# Patient Record
Sex: MALE | Race: OTHER | NOT HISPANIC OR LATINO
[De-identification: names, ages, dates, MRNs, and addresses within clinical notes are randomized per-mention and may not be internally consistent; named-entity substitution may affect disease eponyms.]

---

## 2017-01-02 ENCOUNTER — RESULT REVIEW (OUTPATIENT)
Age: 47
End: 2017-01-02

## 2017-01-03 ENCOUNTER — RESULT REVIEW (OUTPATIENT)
Age: 47
End: 2017-01-03

## 2017-04-04 ENCOUNTER — APPOINTMENT (OUTPATIENT)
Dept: CARDIOLOGY | Facility: CLINIC | Age: 47
End: 2017-04-04

## 2017-04-04 ENCOUNTER — NON-APPOINTMENT (OUTPATIENT)
Age: 47
End: 2017-04-04

## 2017-04-04 VITALS — HEART RATE: 84 BPM | SYSTOLIC BLOOD PRESSURE: 100 MMHG | DIASTOLIC BLOOD PRESSURE: 80 MMHG

## 2017-04-04 VITALS
OXYGEN SATURATION: 99 % | HEART RATE: 73 BPM | SYSTOLIC BLOOD PRESSURE: 117 MMHG | HEIGHT: 68 IN | DIASTOLIC BLOOD PRESSURE: 80 MMHG | RESPIRATION RATE: 17 BRPM | BODY MASS INDEX: 32.74 KG/M2 | WEIGHT: 216 LBS

## 2017-04-10 LAB
ALBUMIN SERPL ELPH-MCNC: 4.5 G/DL
ALP BLD-CCNC: 84 U/L
ALT SERPL-CCNC: 27 U/L
AST SERPL-CCNC: 22 U/L
BILIRUB DIRECT SERPL-MCNC: 0.1 MG/DL
BILIRUB INDIRECT SERPL-MCNC: 0.4 MG/DL
BILIRUB SERPL-MCNC: 0.5 MG/DL
CK SERPL-CCNC: 129 U/L
PROT SERPL-MCNC: 8 G/DL

## 2017-04-20 ENCOUNTER — APPOINTMENT (OUTPATIENT)
Dept: CARDIOLOGY | Facility: CLINIC | Age: 47
End: 2017-04-20

## 2018-07-17 ENCOUNTER — APPOINTMENT (OUTPATIENT)
Dept: FAMILY MEDICINE | Facility: CLINIC | Age: 48
End: 2018-07-17
Payer: COMMERCIAL

## 2018-07-17 VITALS
HEIGHT: 68 IN | HEART RATE: 78 BPM | RESPIRATION RATE: 20 BRPM | DIASTOLIC BLOOD PRESSURE: 78 MMHG | WEIGHT: 218 LBS | BODY MASS INDEX: 33.04 KG/M2 | SYSTOLIC BLOOD PRESSURE: 120 MMHG

## 2018-07-17 DIAGNOSIS — S39.011A STRAIN OF MUSCLE, FASCIA AND TENDON OF ABDOMEN, INITIAL ENCOUNTER: ICD-10-CM

## 2018-07-17 PROCEDURE — 99214 OFFICE O/P EST MOD 30 MIN: CPT | Mod: 25

## 2018-07-17 PROCEDURE — 36415 COLL VENOUS BLD VENIPUNCTURE: CPT

## 2018-07-17 PROCEDURE — G0444 DEPRESSION SCREEN ANNUAL: CPT | Mod: 26

## 2018-07-17 RX ORDER — NAPROXEN 500 MG/1
500 TABLET ORAL
Qty: 60 | Refills: 0 | Status: DISCONTINUED | COMMUNITY
Start: 2017-09-11

## 2018-07-17 NOTE — HEALTH RISK ASSESSMENT
[0] : 2) Feeling down, depressed, or hopeless: Not at all (0) [Discussed at today's visit] : Advance Directives Discussed at today's visit [] : No [FreeTextEntry4] : to discuss with wife

## 2018-07-17 NOTE — PHYSICAL EXAM
[No Acute Distress] : no acute distress [No JVD] : no jugular venous distention [Supple] : supple [No Respiratory Distress] : no respiratory distress  [Clear to Auscultation] : lungs were clear to auscultation bilaterally [No Accessory Muscle Use] : no accessory muscle use [Normal Rate] : normal rate  [Regular Rhythm] : with a regular rhythm [Normal S1, S2] : normal S1 and S2 [No Murmur] : no murmur heard [No Carotid Bruits] : no carotid bruits [No Abdominal Bruit] : a ~M bruit was not heard ~T in the abdomen [No Varicosities] : no varicosities [No Edema] : there was no peripheral edema [No Extremity Clubbing/Cyanosis] : no extremity clubbing/cyanosis [No Palpable Aorta] : no palpable aorta [Soft] : abdomen soft [Non-distended] : non-distended [No Masses] : no abdominal mass palpated [No HSM] : no HSM [Normal Bowel Sounds] : normal bowel sounds [No CVA Tenderness] : no CVA  tenderness [No Spinal Tenderness] : no spinal tenderness [No Joint Swelling] : no joint swelling [Grossly Normal Strength/Tone] : grossly normal strength/tone [No Focal Deficits] : no focal deficits [Alert and Oriented x3] : oriented to person, place, and time [de-identified] : mild tenderness over R lat lower rib area and R side of abd wall

## 2018-07-17 NOTE — HISTORY OF PRESENT ILLNESS
[de-identified] : Presents for BP check, labs, and general follow-up after an extended period of time.  States has been feeling well - trying to do aerobic exercise, watch diet.  Note had been prescribed statin by Cardiology about a year ago but stopped taking it.  Does states has had vague pain R side which is slowly resolving - states had been doing heavy yard work.

## 2018-07-17 NOTE — ASSESSMENT
[FreeTextEntry1] : Hemodynamically stable with cardiac exam unremarkable and acceptable BP\par Feel prob muscle strain from heavy physical work\par Lab profiles sent

## 2018-07-18 LAB
ALBUMIN SERPL ELPH-MCNC: 4.8 G/DL
ALP BLD-CCNC: 84 U/L
ALT SERPL-CCNC: 27 U/L
ANION GAP SERPL CALC-SCNC: 14 MMOL/L
APPEARANCE: CLEAR
AST SERPL-CCNC: 20 U/L
BASOPHILS # BLD AUTO: 0.03 K/UL
BASOPHILS NFR BLD AUTO: 0.3 %
BILIRUB SERPL-MCNC: 0.5 MG/DL
BILIRUBIN URINE: NEGATIVE
BLOOD URINE: NEGATIVE
BUN SERPL-MCNC: 14 MG/DL
CALCIUM SERPL-MCNC: 10.1 MG/DL
CHLORIDE SERPL-SCNC: 103 MMOL/L
CHOLEST SERPL-MCNC: 201 MG/DL
CHOLEST/HDLC SERPL: 5.3 RATIO
CO2 SERPL-SCNC: 22 MMOL/L
COLOR: YELLOW
CREAT SERPL-MCNC: 1.08 MG/DL
EOSINOPHIL # BLD AUTO: 0.27 K/UL
EOSINOPHIL NFR BLD AUTO: 3.1 %
GLUCOSE QUALITATIVE U: NEGATIVE MG/DL
GLUCOSE SERPL-MCNC: 116 MG/DL
HBA1C MFR BLD HPLC: 5.8 %
HCT VFR BLD CALC: 47.6 %
HDLC SERPL-MCNC: 38 MG/DL
HGB BLD-MCNC: 16.3 G/DL
IMM GRANULOCYTES NFR BLD AUTO: 0.2 %
KETONES URINE: NEGATIVE
LDLC SERPL CALC-MCNC: 125 MG/DL
LEUKOCYTE ESTERASE URINE: NEGATIVE
LYMPHOCYTES # BLD AUTO: 2.36 K/UL
LYMPHOCYTES NFR BLD AUTO: 27.4 %
MAN DIFF?: NORMAL
MCHC RBC-ENTMCNC: 28.3 PG
MCHC RBC-ENTMCNC: 34.2 GM/DL
MCV RBC AUTO: 82.6 FL
MONOCYTES # BLD AUTO: 0.48 K/UL
MONOCYTES NFR BLD AUTO: 5.6 %
NEUTROPHILS # BLD AUTO: 5.45 K/UL
NEUTROPHILS NFR BLD AUTO: 63.4 %
NITRITE URINE: NEGATIVE
PH URINE: 5.5
PLATELET # BLD AUTO: 317 K/UL
POTASSIUM SERPL-SCNC: 4.2 MMOL/L
PROT SERPL-MCNC: 7.7 G/DL
PROTEIN URINE: NEGATIVE MG/DL
PSA SERPL-MCNC: 1.26 NG/ML
RBC # BLD: 5.76 M/UL
RBC # FLD: 13.3 %
SODIUM SERPL-SCNC: 139 MMOL/L
SPECIFIC GRAVITY URINE: 1.02
TRIGL SERPL-MCNC: 188 MG/DL
URATE SERPL-MCNC: 8.3 MG/DL
UROBILINOGEN URINE: NEGATIVE MG/DL
WBC # FLD AUTO: 8.61 K/UL

## 2019-06-20 ENCOUNTER — APPOINTMENT (OUTPATIENT)
Dept: FAMILY MEDICINE | Facility: CLINIC | Age: 49
End: 2019-06-20
Payer: COMMERCIAL

## 2019-06-20 VITALS
SYSTOLIC BLOOD PRESSURE: 130 MMHG | DIASTOLIC BLOOD PRESSURE: 80 MMHG | WEIGHT: 220 LBS | OXYGEN SATURATION: 98 % | BODY MASS INDEX: 33.34 KG/M2 | RESPIRATION RATE: 20 BRPM | HEIGHT: 68 IN | HEART RATE: 76 BPM

## 2019-06-20 PROCEDURE — 99213 OFFICE O/P EST LOW 20 MIN: CPT | Mod: 25

## 2019-06-20 PROCEDURE — 36415 COLL VENOUS BLD VENIPUNCTURE: CPT

## 2019-06-20 NOTE — HISTORY OF PRESENT ILLNESS
[FreeTextEntry8] : Presents on acute basis - has been experiencing a "heaviness in the chest" for about a month; comes and goes; states more prominent after exertion - on questioning admits that activities done without problem several months ago are causing him to become "winded" at present.  Denies PND or orthopnea.  Denies actual chest pain.\par Does states smokes 2-3 cigarettes/day.

## 2019-06-20 NOTE — PHYSICAL EXAM
[No Acute Distress] : no acute distress [No JVD] : no jugular venous distention [Supple] : supple [No Lymphadenopathy] : no lymphadenopathy [Thyroid Normal, No Nodules] : the thyroid was normal and there were no nodules present [Clear to Auscultation] : lungs were clear to auscultation bilaterally [No Respiratory Distress] : no respiratory distress  [Normal Rate] : normal rate  [Normal S1, S2] : normal S1 and S2 [Regular Rhythm] : with a regular rhythm [No Accessory Muscle Use] : no accessory muscle use [Soft] : abdomen soft [No Edema] : there was no peripheral edema [No Murmur] : no murmur heard [Non Tender] : non-tender [Non-distended] : non-distended [No Masses] : no abdominal mass palpated [No HSM] : no HSM [Coordination Grossly Intact] : coordination grossly intact [Normal Gait] : normal gait [Normal Bowel Sounds] : normal bowel sounds [No Focal Deficits] : no focal deficits [Alert and Oriented x3] : oriented to person, place, and time [de-identified] : no calf tenderness

## 2019-06-20 NOTE — ASSESSMENT
[FreeTextEntry1] : Subjective OLEARY and "chest heaviness" with essentially normal findings on exam at this encounter\par Lab profiles sent\par Feel prudent to obtain imaging and have Cardiology evaluate - note has seen Dr. South; Pulmonology eval also to be considered

## 2019-06-21 LAB
ALBUMIN SERPL ELPH-MCNC: 5 G/DL
ALP BLD-CCNC: 96 U/L
ALT SERPL-CCNC: 31 U/L
ANION GAP SERPL CALC-SCNC: 12 MMOL/L
AST SERPL-CCNC: 19 U/L
BASOPHILS # BLD AUTO: 0.06 K/UL
BASOPHILS NFR BLD AUTO: 0.6 %
BILIRUB SERPL-MCNC: 0.3 MG/DL
BUN SERPL-MCNC: 14 MG/DL
CALCIUM SERPL-MCNC: 10.3 MG/DL
CHLORIDE SERPL-SCNC: 104 MMOL/L
CHOLEST SERPL-MCNC: 225 MG/DL
CHOLEST/HDLC SERPL: 5.6 RATIO
CO2 SERPL-SCNC: 24 MMOL/L
CREAT SERPL-MCNC: 0.94 MG/DL
EOSINOPHIL # BLD AUTO: 0.28 K/UL
EOSINOPHIL NFR BLD AUTO: 2.8 %
ESTIMATED AVERAGE GLUCOSE: 123 MG/DL
FOLATE SERPL-MCNC: 9.1 NG/ML
GLUCOSE SERPL-MCNC: 109 MG/DL
HBA1C MFR BLD HPLC: 5.9 %
HCT VFR BLD CALC: 49.8 %
HDLC SERPL-MCNC: 40 MG/DL
HGB BLD-MCNC: 16.6 G/DL
IMM GRANULOCYTES NFR BLD AUTO: 0.4 %
LDLC SERPL CALC-MCNC: 136 MG/DL
LYMPHOCYTES # BLD AUTO: 2.76 K/UL
LYMPHOCYTES NFR BLD AUTO: 27.2 %
MAN DIFF?: NORMAL
MCHC RBC-ENTMCNC: 28.1 PG
MCHC RBC-ENTMCNC: 33.3 GM/DL
MCV RBC AUTO: 84.4 FL
MONOCYTES # BLD AUTO: 0.69 K/UL
MONOCYTES NFR BLD AUTO: 6.8 %
NEUTROPHILS # BLD AUTO: 6.33 K/UL
NEUTROPHILS NFR BLD AUTO: 62.2 %
PLATELET # BLD AUTO: 331 K/UL
POTASSIUM SERPL-SCNC: 4 MMOL/L
PROT SERPL-MCNC: 7.5 G/DL
RBC # BLD: 5.9 M/UL
RBC # FLD: 13.4 %
SODIUM SERPL-SCNC: 140 MMOL/L
T4 FREE SERPL-MCNC: 1 NG/DL
TRIGL SERPL-MCNC: 245 MG/DL
TSH SERPL-ACNC: 3.77 UIU/ML
VIT B12 SERPL-MCNC: 336 PG/ML
WBC # FLD AUTO: 10.16 K/UL

## 2019-06-28 ENCOUNTER — APPOINTMENT (OUTPATIENT)
Dept: FAMILY MEDICINE | Facility: CLINIC | Age: 49
End: 2019-06-28

## 2019-07-16 ENCOUNTER — APPOINTMENT (OUTPATIENT)
Dept: CARDIOLOGY | Facility: CLINIC | Age: 49
End: 2019-07-16

## 2020-02-11 ENCOUNTER — APPOINTMENT (OUTPATIENT)
Dept: FAMILY MEDICINE | Facility: CLINIC | Age: 50
End: 2020-02-11
Payer: COMMERCIAL

## 2020-02-11 VITALS
WEIGHT: 227 LBS | HEART RATE: 76 BPM | RESPIRATION RATE: 20 BRPM | BODY MASS INDEX: 34.4 KG/M2 | DIASTOLIC BLOOD PRESSURE: 70 MMHG | SYSTOLIC BLOOD PRESSURE: 126 MMHG | HEIGHT: 68 IN

## 2020-02-11 DIAGNOSIS — K76.0 FATTY (CHANGE OF) LIVER, NOT ELSEWHERE CLASSIFIED: ICD-10-CM

## 2020-02-11 DIAGNOSIS — R91.1 SOLITARY PULMONARY NODULE: ICD-10-CM

## 2020-02-11 PROCEDURE — 36415 COLL VENOUS BLD VENIPUNCTURE: CPT

## 2020-02-11 PROCEDURE — 99396 PREV VISIT EST AGE 40-64: CPT | Mod: 25

## 2020-02-11 PROCEDURE — 93000 ELECTROCARDIOGRAM COMPLETE: CPT

## 2020-02-11 NOTE — HISTORY OF PRESENT ILLNESS
[FreeTextEntry1] : Requests comprehensive exam [de-identified] : Presents for comprehensive exam.  States continues to be concerned about intermittent chest discomfort and exertional dyspnea.  Did not obtain CT ordered at last visit due to insurance denial.  Note known H/O pulmonary nodule that heightens concern of patient.  Also C/O intermittent R-sided abdominal discomfort; again note known H/O hepatic steatosis.  Otherwise actively trying to watch diet and remain active.  Continues to smoke 2-3 cigarettes/day - discussed cessation.  Reviewed screening and immunizations - offered HIV screening per guidelines - pt is amenable.

## 2020-02-11 NOTE — HEALTH RISK ASSESSMENT
[] : Yes [6-10] : 6-10 [Yes] : Yes [Monthly or less (1 pt)] : Monthly or less (1 point) [1 or 2 (0 pts)] : 1 or 2 (0 points) [Never (0 pts)] : Never (0 points) [No] : In the past 12 months have you used drugs other than those required for medical reasons? No [0] : 1) Little interest or pleasure doing things: Not at all (0) [With Patient/Caregiver] : With Patient/Caregiver [Audit-CScore] : 1 [AdvancecareDate] : 02/20 [FreeTextEntry4] : to check records

## 2020-02-11 NOTE — PHYSICAL EXAM
[Declined Rectal Exam] : declined rectal exam [Normal Posterior Cervical Nodes] : no posterior cervical lymphadenopathy [Normal Anterior Cervical Nodes] : no anterior cervical lymphadenopathy [Normal Inguinal Nodes] : no inguinal lymphadenopathy [Normal Femoral Nodes] : no femoral lymphadenopathy [Normal] : no rash [Coordination Grossly Intact] : coordination grossly intact [No Focal Deficits] : no focal deficits [Normal Gait] : normal gait [Speech Grossly Normal] : speech grossly normal [Memory Grossly Normal] : memory grossly normal [Alert and Oriented x3] : oriented to person, place, and time [Normal Affect] : the affect was normal [Normal Mood] : the mood was normal [Normal Insight/Judgement] : insight and judgment were intact [FreeTextEntry1] : declined exam

## 2020-02-11 NOTE — COUNSELING
[Risk of tobacco use and health benefits of smoking cessation discussed] : Risk of tobacco use and health benefits of smoking cessation discussed [Cessation strategies including cessation program discussed] : Cessation strategies including cessation program discussed [Willing to Quit Smoking] : Willing to quit smoking [Benefits of weight loss discussed] : Benefits of weight loss discussed [de-identified] : Healthy eating and activities [None] : None [Good understanding] : Patient has a good understanding of lifestyle changes and steps needed to achieve self management goal

## 2020-02-11 NOTE — REVIEW OF SYSTEMS
[Chest Pain] : chest pain [Shortness Of Breath] : shortness of breath [Abdominal Pain] : abdominal pain [Negative] : Heme/Lymph

## 2020-02-11 NOTE — ASSESSMENT
[FreeTextEntry1] : Comprehensive exam reveals patient to be hemodynamically stable with acceptable BP\par With known H/O lung nodule and hepatic steatosis and symptoms related to those systems feel prudent to attempt to obtain CT imaging to R/O underlying advancing pathology\par Lab profiles drawn in office and sent

## 2020-02-12 LAB
ALBUMIN SERPL ELPH-MCNC: 4.8 G/DL
ALP BLD-CCNC: 93 U/L
ALT SERPL-CCNC: 37 U/L
ANION GAP SERPL CALC-SCNC: 13 MMOL/L
APPEARANCE: CLEAR
AST SERPL-CCNC: 29 U/L
BASOPHILS # BLD AUTO: 0.08 K/UL
BASOPHILS NFR BLD AUTO: 0.7 %
BILIRUB SERPL-MCNC: 0.2 MG/DL
BILIRUBIN URINE: NEGATIVE
BLOOD URINE: NEGATIVE
BUN SERPL-MCNC: 11 MG/DL
CALCIUM SERPL-MCNC: 9.7 MG/DL
CHLORIDE SERPL-SCNC: 104 MMOL/L
CHOLEST SERPL-MCNC: 218 MG/DL
CHOLEST/HDLC SERPL: 5.9 RATIO
CO2 SERPL-SCNC: 23 MMOL/L
COLOR: NORMAL
CREAT SERPL-MCNC: 1.03 MG/DL
EOSINOPHIL # BLD AUTO: 0.32 K/UL
EOSINOPHIL NFR BLD AUTO: 2.7 %
ESTIMATED AVERAGE GLUCOSE: 134 MG/DL
GLUCOSE QUALITATIVE U: NEGATIVE
GLUCOSE SERPL-MCNC: 114 MG/DL
HBA1C MFR BLD HPLC: 6.3 %
HCT VFR BLD CALC: 46.3 %
HDLC SERPL-MCNC: 37 MG/DL
HGB BLD-MCNC: 16 G/DL
HIV1+2 AB SPEC QL IA.RAPID: NONREACTIVE
IMM GRANULOCYTES NFR BLD AUTO: 0.5 %
KETONES URINE: NEGATIVE
LDLC SERPL CALC-MCNC: 101 MG/DL
LEUKOCYTE ESTERASE URINE: NEGATIVE
LYMPHOCYTES # BLD AUTO: 2.8 K/UL
LYMPHOCYTES NFR BLD AUTO: 23.7 %
MAN DIFF?: NORMAL
MCHC RBC-ENTMCNC: 28.6 PG
MCHC RBC-ENTMCNC: 34.6 GM/DL
MCV RBC AUTO: 82.7 FL
MONOCYTES # BLD AUTO: 0.63 K/UL
MONOCYTES NFR BLD AUTO: 5.3 %
NEUTROPHILS # BLD AUTO: 7.91 K/UL
NEUTROPHILS NFR BLD AUTO: 67.1 %
NITRITE URINE: NEGATIVE
PH URINE: 5.5
PLATELET # BLD AUTO: 341 K/UL
POTASSIUM SERPL-SCNC: 4 MMOL/L
PROT SERPL-MCNC: 7.3 G/DL
PROTEIN URINE: NEGATIVE
PSA SERPL-MCNC: 1 NG/ML
RBC # BLD: 5.6 M/UL
RBC # FLD: 12.5 %
SODIUM SERPL-SCNC: 139 MMOL/L
SPECIFIC GRAVITY URINE: 1.01
T4 FREE SERPL-MCNC: 1.2 NG/DL
TRIGL SERPL-MCNC: 398 MG/DL
TSH SERPL-ACNC: 3.2 UIU/ML
URATE SERPL-MCNC: 7.6 MG/DL
UROBILINOGEN URINE: NORMAL
WBC # FLD AUTO: 11.8 K/UL

## 2020-02-24 ENCOUNTER — FORM ENCOUNTER (OUTPATIENT)
Age: 50
End: 2020-02-24

## 2020-02-25 ENCOUNTER — OUTPATIENT (OUTPATIENT)
Dept: OUTPATIENT SERVICES | Facility: HOSPITAL | Age: 50
LOS: 1 days | End: 2020-02-25
Payer: COMMERCIAL

## 2020-02-25 ENCOUNTER — APPOINTMENT (OUTPATIENT)
Dept: RADIOLOGY | Facility: HOSPITAL | Age: 50
End: 2020-02-25
Payer: COMMERCIAL

## 2020-02-25 ENCOUNTER — APPOINTMENT (OUTPATIENT)
Dept: ULTRASOUND IMAGING | Facility: HOSPITAL | Age: 50
End: 2020-02-25
Payer: COMMERCIAL

## 2020-02-25 DIAGNOSIS — K76.0 FATTY (CHANGE OF) LIVER, NOT ELSEWHERE CLASSIFIED: ICD-10-CM

## 2020-02-25 PROCEDURE — 76700 US EXAM ABDOM COMPLETE: CPT

## 2020-02-25 PROCEDURE — 76700 US EXAM ABDOM COMPLETE: CPT | Mod: 26

## 2020-02-26 ENCOUNTER — FORM ENCOUNTER (OUTPATIENT)
Age: 50
End: 2020-02-26

## 2020-02-27 ENCOUNTER — OUTPATIENT (OUTPATIENT)
Dept: OUTPATIENT SERVICES | Facility: HOSPITAL | Age: 50
LOS: 1 days | End: 2020-02-27
Payer: COMMERCIAL

## 2020-02-27 ENCOUNTER — APPOINTMENT (OUTPATIENT)
Dept: RADIOLOGY | Facility: HOSPITAL | Age: 50
End: 2020-02-27
Payer: COMMERCIAL

## 2020-02-27 DIAGNOSIS — R91.1 SOLITARY PULMONARY NODULE: ICD-10-CM

## 2020-02-27 PROCEDURE — 71046 X-RAY EXAM CHEST 2 VIEWS: CPT | Mod: 26

## 2020-02-27 PROCEDURE — 71046 X-RAY EXAM CHEST 2 VIEWS: CPT

## 2020-07-19 ENCOUNTER — TRANSCRIPTION ENCOUNTER (OUTPATIENT)
Age: 50
End: 2020-07-19

## 2020-10-30 ENCOUNTER — TRANSCRIPTION ENCOUNTER (OUTPATIENT)
Age: 50
End: 2020-10-30

## 2021-03-16 ENCOUNTER — TRANSCRIPTION ENCOUNTER (OUTPATIENT)
Age: 51
End: 2021-03-16

## 2021-03-23 ENCOUNTER — TRANSCRIPTION ENCOUNTER (OUTPATIENT)
Age: 51
End: 2021-03-23

## 2021-04-01 ENCOUNTER — OUTPATIENT (OUTPATIENT)
Dept: OUTPATIENT SERVICES | Facility: HOSPITAL | Age: 51
LOS: 1 days | End: 2021-04-01
Payer: COMMERCIAL

## 2021-04-01 DIAGNOSIS — Z20.828 CONTACT WITH AND (SUSPECTED) EXPOSURE TO OTHER VIRAL COMMUNICABLE DISEASES: ICD-10-CM

## 2021-04-01 LAB — SARS-COV-2 RNA SPEC QL NAA+PROBE: SIGNIFICANT CHANGE UP

## 2021-04-01 PROCEDURE — U0003: CPT

## 2021-04-01 PROCEDURE — C9803: CPT

## 2021-04-01 PROCEDURE — U0005: CPT

## 2021-04-02 DIAGNOSIS — Z20.828 CONTACT WITH AND (SUSPECTED) EXPOSURE TO OTHER VIRAL COMMUNICABLE DISEASES: ICD-10-CM

## 2021-04-27 ENCOUNTER — TRANSCRIPTION ENCOUNTER (OUTPATIENT)
Age: 51
End: 2021-04-27

## 2021-10-29 ENCOUNTER — NON-APPOINTMENT (OUTPATIENT)
Age: 51
End: 2021-10-29

## 2021-10-29 ENCOUNTER — APPOINTMENT (OUTPATIENT)
Dept: FAMILY MEDICINE | Facility: CLINIC | Age: 51
End: 2021-10-29
Payer: COMMERCIAL

## 2021-10-29 VITALS
RESPIRATION RATE: 15 BRPM | TEMPERATURE: 97 F | BODY MASS INDEX: 34.4 KG/M2 | DIASTOLIC BLOOD PRESSURE: 80 MMHG | SYSTOLIC BLOOD PRESSURE: 124 MMHG | HEART RATE: 76 BPM | OXYGEN SATURATION: 96 % | HEIGHT: 68 IN | WEIGHT: 227 LBS

## 2021-10-29 PROCEDURE — 93000 ELECTROCARDIOGRAM COMPLETE: CPT

## 2021-10-29 PROCEDURE — 99214 OFFICE O/P EST MOD 30 MIN: CPT | Mod: 25

## 2021-10-29 NOTE — PHYSICAL EXAM
[No Acute Distress] : no acute distress [EOMI] : extraocular movements intact [Supple] : supple [Clear to Auscultation] : lungs were clear to auscultation bilaterally [Normal S1, S2] : normal S1 and S2 [Soft] : abdomen soft [Non Tender] : non-tender [No Rash] : no rash [Normal Gait] : normal gait [Normal Affect] : the affect was normal [de-identified] : obese

## 2021-10-29 NOTE — REVIEW OF SYSTEMS
[Chest Pain] : chest pain [Palpitations] : no palpitations [Claudication] : no  leg claudication [Lower Ext Edema] : no lower extremity edema [Orthopena] : no orthopnea [Negative] : Heme/Lymph none

## 2021-10-29 NOTE — ASSESSMENT
[FreeTextEntry1] : labs redrawn today\par EKG, vitals wnl\par advised to follow up with cardiology, referral provided\par advised to go to ER if severe worsening symptoms.

## 2021-10-29 NOTE — HISTORY OF PRESENT ILLNESS
[FreeTextEntry8] : left sided chest discomfort \par Mr Jigar Matamoros is a 52 yo male presents today for concerns of left sided chest discomfort, chest pressure, for the last few days on/off. Pt reports it occurs at rest, non exertional, possibly muscle strain. Pt has history of dyslipidemia, obesity, prior smoker, not on any statin therapy, aspirin. Pt states followed up with cardiologist in 2017, was recommended statin therapy, states he is intolerant to statins, and did not follow up. Pt advised today EKG was baseline, no acute changes. Recommended follow up back with cardiology, referral provided again today.

## 2021-11-02 ENCOUNTER — NON-APPOINTMENT (OUTPATIENT)
Age: 51
End: 2021-11-02

## 2021-11-02 ENCOUNTER — APPOINTMENT (OUTPATIENT)
Dept: CARDIOLOGY | Facility: CLINIC | Age: 51
End: 2021-11-02
Payer: COMMERCIAL

## 2021-11-02 VITALS
WEIGHT: 223 LBS | OXYGEN SATURATION: 98 % | HEIGHT: 68 IN | TEMPERATURE: 97.3 F | BODY MASS INDEX: 33.8 KG/M2 | HEART RATE: 80 BPM | RESPIRATION RATE: 16 BRPM | DIASTOLIC BLOOD PRESSURE: 82 MMHG | SYSTOLIC BLOOD PRESSURE: 123 MMHG

## 2021-11-02 VITALS — DIASTOLIC BLOOD PRESSURE: 82 MMHG | SYSTOLIC BLOOD PRESSURE: 118 MMHG | BODY MASS INDEX: 33.91 KG/M2 | HEIGHT: 68 IN

## 2021-11-02 LAB
25(OH)D3 SERPL-MCNC: 24.6 NG/ML
ALBUMIN SERPL ELPH-MCNC: 4.9 G/DL
ALP BLD-CCNC: 94 U/L
ALT SERPL-CCNC: 28 U/L
ANION GAP SERPL CALC-SCNC: 14 MMOL/L
APPEARANCE: CLEAR
AST SERPL-CCNC: 20 U/L
BASOPHILS # BLD AUTO: 0.06 K/UL
BASOPHILS NFR BLD AUTO: 0.6 %
BILIRUB SERPL-MCNC: 0.3 MG/DL
BILIRUBIN URINE: NEGATIVE
BLOOD URINE: NEGATIVE
BUN SERPL-MCNC: 15 MG/DL
CALCIUM SERPL-MCNC: 9.8 MG/DL
CHLORIDE SERPL-SCNC: 104 MMOL/L
CHOLEST SERPL-MCNC: 230 MG/DL
CK SERPL-CCNC: 137 U/L
CO2 SERPL-SCNC: 21 MMOL/L
COLOR: NORMAL
CREAT SERPL-MCNC: 1.08 MG/DL
EOSINOPHIL # BLD AUTO: 0.31 K/UL
EOSINOPHIL NFR BLD AUTO: 2.9 %
ESTIMATED AVERAGE GLUCOSE: 137 MG/DL
FOLATE SERPL-MCNC: 13.5 NG/ML
GLUCOSE QUALITATIVE U: NEGATIVE
GLUCOSE SERPL-MCNC: 122 MG/DL
HBA1C MFR BLD HPLC: 6.4 %
HCT VFR BLD CALC: 49.9 %
HDLC SERPL-MCNC: 44 MG/DL
HGB BLD-MCNC: 17 G/DL
IMM GRANULOCYTES NFR BLD AUTO: 0.7 %
KETONES URINE: NEGATIVE
LDLC SERPL CALC-MCNC: 156 MG/DL
LDLC SERPL DIRECT ASSAY-MCNC: 160 MG/DL
LEUKOCYTE ESTERASE URINE: NEGATIVE
LYMPHOCYTES # BLD AUTO: 3.12 K/UL
LYMPHOCYTES NFR BLD AUTO: 29.4 %
MAN DIFF?: NORMAL
MCHC RBC-ENTMCNC: 28.9 PG
MCHC RBC-ENTMCNC: 34.1 GM/DL
MCV RBC AUTO: 84.7 FL
MONOCYTES # BLD AUTO: 0.64 K/UL
MONOCYTES NFR BLD AUTO: 6 %
NEUTROPHILS # BLD AUTO: 6.42 K/UL
NEUTROPHILS NFR BLD AUTO: 60.4 %
NITRITE URINE: NEGATIVE
NONHDLC SERPL-MCNC: 185 MG/DL
PH URINE: 5.5
PLATELET # BLD AUTO: 330 K/UL
POTASSIUM SERPL-SCNC: 4.9 MMOL/L
PROT SERPL-MCNC: 7.6 G/DL
PROTEIN URINE: NEGATIVE
PSA SERPL-MCNC: 1.19 NG/ML
RBC # BLD: 5.89 M/UL
RBC # FLD: 12.7 %
SODIUM SERPL-SCNC: 139 MMOL/L
SPECIFIC GRAVITY URINE: 1.02
T4 FREE SERPL-MCNC: 1.1 NG/DL
TRIGL SERPL-MCNC: 146 MG/DL
TSH SERPL-ACNC: 2.78 UIU/ML
UROBILINOGEN URINE: NORMAL
VIT B12 SERPL-MCNC: 403 PG/ML
WBC # FLD AUTO: 10.62 K/UL

## 2021-11-02 PROCEDURE — 93000 ELECTROCARDIOGRAM COMPLETE: CPT

## 2021-11-02 PROCEDURE — 99204 OFFICE O/P NEW MOD 45 MIN: CPT

## 2021-11-02 NOTE — ASSESSMENT
[FreeTextEntry1] : Impression:\par 1. Probable noncardiac chest pain in a patient with known coronary disease for approximately 10 years with significant dyslipidemia who has been unable to treat this due to his intolerance of statins\par \par Plan:\par 1. For now will get echocardiogram to rule out any pericardial involvement\par 2. Exercise stress testing to rule out ischemia\par 3. Will prescribe Repatha as given the fact that the patient has premature coronary disease he is at very high risk for events and is in need of lipid lowering therapy

## 2021-11-02 NOTE — PHYSICAL EXAM
[General Appearance - Well Developed] : well developed [Normal Appearance] : normal appearance [Well Groomed] : well groomed [General Appearance - Well Nourished] : well nourished [No Deformities] : no deformities [General Appearance - In No Acute Distress] : no acute distress [Normal Oral Mucosa] : normal oral mucosa [No Oral Pallor] : no oral pallor [No Oral Cyanosis] : no oral cyanosis [Normal Jugular Venous A Waves Present] : normal jugular venous A waves present [Normal Jugular Venous V Waves Present] : normal jugular venous V waves present [No Jugular Venous Meza A Waves] : no jugular venous meza A waves [Respiration, Rhythm And Depth] : normal respiratory rhythm and effort [Exaggerated Use Of Accessory Muscles For Inspiration] : no accessory muscle use [Auscultation Breath Sounds / Voice Sounds] : lungs were clear to auscultation bilaterally [Abdomen Soft] : soft [Abdomen Tenderness] : non-tender [Abdomen Mass (___ Cm)] : no abdominal mass palpated [Abnormal Walk] : normal gait [Gait - Sufficient For Exercise Testing] : the gait was sufficient for exercise testing [Nail Clubbing] : no clubbing of the fingernails [Cyanosis, Localized] : no localized cyanosis [Petechial Hemorrhages (___cm)] : no petechial hemorrhages [Skin Color & Pigmentation] : normal skin color and pigmentation [] : no rash [No Venous Stasis] : no venous stasis [Skin Lesions] : no skin lesions [No Skin Ulcers] : no skin ulcer [No Xanthoma] : no  xanthoma was observed [Oriented To Time, Place, And Person] : oriented to person, place, and time [Affect] : the affect was normal [Mood] : the mood was normal [No Anxiety] : not feeling anxious [Normal Rate] : normal [Normal S1] : normal S1 [Normal S2] : normal S2 [S3] : no S3 [S4] : no S4 [No Murmur] : no murmurs heard [Right Carotid Bruit] : no bruit heard over the right carotid [Left Carotid Bruit] : no bruit heard over the left carotid [Right Femoral Bruit] : no bruit heard over the right femoral artery [Left Femoral Bruit] : no bruit heard over the left femoral artery [2+] : left 2+ [No Abnormalities] : the abdominal aorta was not enlarged and no bruit was heard [No Pitting Edema] : no pitting edema present

## 2021-11-02 NOTE — REASON FOR VISIT
[Symptom and Test Evaluation] : symptom and test evaluation [Coronary Artery Disease] : coronary artery disease [FreeTextEntry1] : This is a 51-year-old man with a history of nonobstructive coronary disease by CTA 12 years ago that included a less than 50% LAD less than 50% circumflex and 70% small right coronary artery. The patient has a history of significant hyperlipidemia and has been totally intolerant of low-dose Crestor and low-dose pravastatin. His most recent lipid profile from last week reveals a total cholesterol 2:30 HDL 44 . The patient over the past several weeks has been complaining of a left-sided chest tightness that lasts from one to 5 minutes not necessarily exertion related and worse with movement of his head and arm. There is no associated shortness of breath nausea vomiting or diaphoresis. The patient was a smoker in the past but has not smoked for some time.

## 2021-11-17 ENCOUNTER — APPOINTMENT (OUTPATIENT)
Dept: CARDIOLOGY | Facility: CLINIC | Age: 51
End: 2021-11-17
Payer: COMMERCIAL

## 2021-11-17 PROCEDURE — 93015 CV STRESS TEST SUPVJ I&R: CPT

## 2021-11-17 PROCEDURE — 93306 TTE W/DOPPLER COMPLETE: CPT

## 2021-11-17 PROCEDURE — 99212 OFFICE O/P EST SF 10 MIN: CPT

## 2021-11-17 NOTE — REASON FOR VISIT
[Other: ____] : [unfilled] [FreeTextEntry1] : Patient presented for stress testing and echocardiography today. They were both favorable. He has normal left ventricular systolic function with no evidence of significant valvular stenosis or insufficiency. Stress testing was negative for ischemia and there was no reproduction of his symptoms. He does on occasion still have the same atypical chest pain he has had in the past. He is extremely concerned as his insurance company will not allow Repatha. He is very concerned over his risk of myocardial infarction. He has been suggested by his insurance company that he try Zetia. I have explained to him that Zetia has minimal efficacy in the absence of statins but we certainly can try it. He is asking about other homeopathic-type remedies. I have explained to him that the data does not support these areas he has been prescribed both low-dose Crestor and pravastatin in the past and has had to stop it due to severe muscle aches. His LDL cholesterol is 156. He has a total cholesterol to HDL ratio of well over 5. He has evidence of coronary artery disease by CT scan. Lipid-lowering therapy with Repatha and/or Praluent is absolutely indicated in this situation. I will be undertaking a "peer to peer " discussion with the representatives of his insurance company later today

## 2021-11-18 ENCOUNTER — NON-APPOINTMENT (OUTPATIENT)
Age: 51
End: 2021-11-18

## 2021-11-23 ENCOUNTER — TRANSCRIPTION ENCOUNTER (OUTPATIENT)
Age: 51
End: 2021-11-23

## 2022-01-31 ENCOUNTER — RX RENEWAL (OUTPATIENT)
Age: 52
End: 2022-01-31

## 2022-02-14 ENCOUNTER — APPOINTMENT (OUTPATIENT)
Dept: FAMILY MEDICINE | Facility: CLINIC | Age: 52
End: 2022-02-14
Payer: COMMERCIAL

## 2022-02-14 VITALS
HEIGHT: 68 IN | SYSTOLIC BLOOD PRESSURE: 124 MMHG | WEIGHT: 222 LBS | BODY MASS INDEX: 33.65 KG/M2 | DIASTOLIC BLOOD PRESSURE: 70 MMHG | HEART RATE: 68 BPM | RESPIRATION RATE: 20 BRPM

## 2022-02-14 PROCEDURE — 90686 IIV4 VACC NO PRSV 0.5 ML IM: CPT

## 2022-02-14 PROCEDURE — 36415 COLL VENOUS BLD VENIPUNCTURE: CPT

## 2022-02-14 PROCEDURE — 99396 PREV VISIT EST AGE 40-64: CPT | Mod: 25

## 2022-02-14 PROCEDURE — G0008: CPT

## 2022-02-14 NOTE — HEALTH RISK ASSESSMENT
[Former] : Former [Yes] : Yes [Monthly or less (1 pt)] : Monthly or less (1 point) [1 or 2 (0 pts)] : 1 or 2 (0 points) [Never (0 pts)] : Never (0 points) [No] : In the past 12 months have you used drugs other than those required for medical reasons? No [0] : 2) Feeling down, depressed, or hopeless: Not at all (0) [With Patient/Caregiver] : , with patient/caregiver [Reviewed no changes] : Reviewed, no changes [YearQuit] : 2022 [Audit-CScore] : 1 [AdvancecareDate] : 02/22

## 2022-02-14 NOTE — COUNSELING
[Yes] : Willing to quit smoking [FreeTextEntry2] : note patient has stopped smoking [de-identified] : Healthy eating and activities

## 2022-02-14 NOTE — ASSESSMENT
[FreeTextEntry1] : Comprehensive exam reveals patient to be hemodynamically stable with acceptable BP\par Lab profiles drawn in office and sent\par Flu vaccine given L deltoid

## 2022-02-14 NOTE — HISTORY OF PRESENT ILLNESS
[FreeTextEntry1] : Requests comprehensive exam [de-identified] : Presents for comprehensive exam.  States feeling generally well; trying to watch diet and remain active.  Reviewed recent Cardiology documentation - note pt has been intolerant of statins thus far, but Cardiology was unable to obtain insurance approval for Repatha.  Reviewed screening and immunizations and placed an order for pt's initial screening colonoscopy; also note pt is fully COVID vaccinated including the "booster;" has not received the current flu vaccine but wishes to have one at this encounter.  Also discussed smoking - note pt has stopped smoking over the last several months.

## 2022-02-15 LAB
ALBUMIN SERPL ELPH-MCNC: 4.7 G/DL
ALP BLD-CCNC: 79 U/L
ALT SERPL-CCNC: 23 U/L
ANION GAP SERPL CALC-SCNC: 12 MMOL/L
APPEARANCE: CLEAR
AST SERPL-CCNC: 18 U/L
BASOPHILS # BLD AUTO: 0.08 K/UL
BASOPHILS NFR BLD AUTO: 0.8 %
BILIRUB SERPL-MCNC: 0.3 MG/DL
BILIRUBIN URINE: NEGATIVE
BLOOD URINE: NEGATIVE
BUN SERPL-MCNC: 13 MG/DL
CALCIUM SERPL-MCNC: 9.7 MG/DL
CHLORIDE SERPL-SCNC: 104 MMOL/L
CHOLEST SERPL-MCNC: 230 MG/DL
CO2 SERPL-SCNC: 24 MMOL/L
COLOR: NORMAL
CREAT SERPL-MCNC: 0.92 MG/DL
EOSINOPHIL # BLD AUTO: 0.34 K/UL
EOSINOPHIL NFR BLD AUTO: 3.5 %
ESTIMATED AVERAGE GLUCOSE: 128 MG/DL
GLUCOSE QUALITATIVE U: NEGATIVE
GLUCOSE SERPL-MCNC: 125 MG/DL
HBA1C MFR BLD HPLC: 6.1 %
HCT VFR BLD CALC: 48.3 %
HDLC SERPL-MCNC: 44 MG/DL
HGB BLD-MCNC: 16.4 G/DL
IMM GRANULOCYTES NFR BLD AUTO: 0.4 %
KETONES URINE: NEGATIVE
LDLC SERPL CALC-MCNC: 128 MG/DL
LEUKOCYTE ESTERASE URINE: NEGATIVE
LYMPHOCYTES # BLD AUTO: 2.86 K/UL
LYMPHOCYTES NFR BLD AUTO: 29.7 %
MAN DIFF?: NORMAL
MCHC RBC-ENTMCNC: 28.2 PG
MCHC RBC-ENTMCNC: 34 GM/DL
MCV RBC AUTO: 83 FL
MONOCYTES # BLD AUTO: 0.47 K/UL
MONOCYTES NFR BLD AUTO: 4.9 %
NEUTROPHILS # BLD AUTO: 5.84 K/UL
NEUTROPHILS NFR BLD AUTO: 60.7 %
NITRITE URINE: NEGATIVE
NONHDLC SERPL-MCNC: 186 MG/DL
PH URINE: 5.5
PLATELET # BLD AUTO: 294 K/UL
POTASSIUM SERPL-SCNC: 4.1 MMOL/L
PROT SERPL-MCNC: 7.1 G/DL
PROTEIN URINE: NEGATIVE
RBC # BLD: 5.82 M/UL
RBC # FLD: 12.6 %
SODIUM SERPL-SCNC: 139 MMOL/L
SPECIFIC GRAVITY URINE: 1.02
TRIGL SERPL-MCNC: 289 MG/DL
UROBILINOGEN URINE: NORMAL
WBC # FLD AUTO: 9.63 K/UL

## 2022-04-09 ENCOUNTER — INPATIENT (INPATIENT)
Facility: HOSPITAL | Age: 52
LOS: 2 days | Discharge: HOME | DRG: 247 | End: 2022-04-12
Attending: HOSPITALIST | Admitting: STUDENT IN AN ORGANIZED HEALTH CARE EDUCATION/TRAINING PROGRAM
Payer: COMMERCIAL

## 2022-04-09 ENCOUNTER — EMERGENCY (EMERGENCY)
Facility: HOSPITAL | Age: 52
LOS: 1 days | Discharge: ANOTHER HOSPITAL | End: 2022-04-09
Attending: EMERGENCY MEDICINE | Admitting: EMERGENCY MEDICINE
Payer: COMMERCIAL

## 2022-04-09 VITALS
DIASTOLIC BLOOD PRESSURE: 101 MMHG | WEIGHT: 220.02 LBS | RESPIRATION RATE: 19 BRPM | OXYGEN SATURATION: 99 % | SYSTOLIC BLOOD PRESSURE: 154 MMHG | TEMPERATURE: 99 F | HEART RATE: 63 BPM

## 2022-04-09 VITALS
SYSTOLIC BLOOD PRESSURE: 164 MMHG | HEART RATE: 71 BPM | TEMPERATURE: 99 F | DIASTOLIC BLOOD PRESSURE: 85 MMHG | WEIGHT: 279.99 LBS | OXYGEN SATURATION: 97 % | RESPIRATION RATE: 20 BRPM | HEIGHT: 68 IN

## 2022-04-09 VITALS
SYSTOLIC BLOOD PRESSURE: 113 MMHG | RESPIRATION RATE: 15 BRPM | HEART RATE: 82 BPM | OXYGEN SATURATION: 96 % | DIASTOLIC BLOOD PRESSURE: 70 MMHG

## 2022-04-09 DIAGNOSIS — Z29.9 ENCOUNTER FOR PROPHYLACTIC MEASURES, UNSPECIFIED: ICD-10-CM

## 2022-04-09 DIAGNOSIS — I21.4 NON-ST ELEVATION (NSTEMI) MYOCARDIAL INFARCTION: ICD-10-CM

## 2022-04-09 DIAGNOSIS — E11.9 TYPE 2 DIABETES MELLITUS WITHOUT COMPLICATIONS: ICD-10-CM

## 2022-04-09 DIAGNOSIS — E78.5 HYPERLIPIDEMIA, UNSPECIFIED: ICD-10-CM

## 2022-04-09 DIAGNOSIS — F14.90 COCAINE USE, UNSPECIFIED, UNCOMPLICATED: ICD-10-CM

## 2022-04-09 LAB
ALBUMIN SERPL ELPH-MCNC: 4.3 G/DL — SIGNIFICANT CHANGE UP (ref 3.3–5)
ALP SERPL-CCNC: 80 U/L — SIGNIFICANT CHANGE UP (ref 40–120)
ALT FLD-CCNC: 40 U/L — SIGNIFICANT CHANGE UP (ref 10–45)
AMPHET UR-MCNC: NEGATIVE — SIGNIFICANT CHANGE UP
ANION GAP SERPL CALC-SCNC: 8 MMOL/L — SIGNIFICANT CHANGE UP (ref 5–17)
APTT BLD: 122.9 SEC — CRITICAL HIGH (ref 27.5–35.5)
APTT BLD: 35.4 SEC — SIGNIFICANT CHANGE UP (ref 27.5–35.5)
AST SERPL-CCNC: 22 U/L — SIGNIFICANT CHANGE UP (ref 10–40)
BARBITURATES UR SCN-MCNC: NEGATIVE — SIGNIFICANT CHANGE UP
BASOPHILS # BLD AUTO: 0.08 K/UL — SIGNIFICANT CHANGE UP (ref 0–0.2)
BASOPHILS NFR BLD AUTO: 0.8 % — SIGNIFICANT CHANGE UP (ref 0–2)
BENZODIAZ UR-MCNC: NEGATIVE — SIGNIFICANT CHANGE UP
BILIRUB SERPL-MCNC: 0.4 MG/DL — SIGNIFICANT CHANGE UP (ref 0.2–1.2)
BLD GP AB SCN SERPL QL: NEGATIVE — SIGNIFICANT CHANGE UP
BUN SERPL-MCNC: 17 MG/DL — SIGNIFICANT CHANGE UP (ref 7–23)
CALCIUM SERPL-MCNC: 9.9 MG/DL — SIGNIFICANT CHANGE UP (ref 8.4–10.5)
CHLORIDE SERPL-SCNC: 105 MMOL/L — SIGNIFICANT CHANGE UP (ref 96–108)
CO2 SERPL-SCNC: 29 MMOL/L — SIGNIFICANT CHANGE UP (ref 22–31)
COCAINE METAB.OTHER UR-MCNC: POSITIVE
CREAT SERPL-MCNC: 1.2 MG/DL — SIGNIFICANT CHANGE UP (ref 0.5–1.3)
EGFR: 74 ML/MIN/1.73M2 — SIGNIFICANT CHANGE UP
EOSINOPHIL # BLD AUTO: 0.31 K/UL — SIGNIFICANT CHANGE UP (ref 0–0.5)
EOSINOPHIL NFR BLD AUTO: 3 % — SIGNIFICANT CHANGE UP (ref 0–6)
GLUCOSE BLDC GLUCOMTR-MCNC: 131 MG/DL — HIGH (ref 70–99)
GLUCOSE SERPL-MCNC: 118 MG/DL — HIGH (ref 70–99)
HCT VFR BLD CALC: 46.7 % — SIGNIFICANT CHANGE UP (ref 39–50)
HGB BLD-MCNC: 16.2 G/DL — SIGNIFICANT CHANGE UP (ref 13–17)
IMM GRANULOCYTES NFR BLD AUTO: 0.5 % — SIGNIFICANT CHANGE UP (ref 0–1.5)
INR BLD: 1.07 RATIO — SIGNIFICANT CHANGE UP (ref 0.88–1.16)
INR BLD: 1.18 RATIO — HIGH (ref 0.88–1.16)
LIDOCAIN IGE QN: 179 U/L — SIGNIFICANT CHANGE UP (ref 73–393)
LYMPHOCYTES # BLD AUTO: 29.8 % — SIGNIFICANT CHANGE UP (ref 13–44)
LYMPHOCYTES # BLD AUTO: 3.03 K/UL — SIGNIFICANT CHANGE UP (ref 1–3.3)
MAGNESIUM SERPL-MCNC: 2 MG/DL — SIGNIFICANT CHANGE UP (ref 1.6–2.6)
MCHC RBC-ENTMCNC: 29 PG — SIGNIFICANT CHANGE UP (ref 27–34)
MCHC RBC-ENTMCNC: 34.7 GM/DL — SIGNIFICANT CHANGE UP (ref 32–36)
MCV RBC AUTO: 83.5 FL — SIGNIFICANT CHANGE UP (ref 80–100)
METHADONE UR-MCNC: NEGATIVE — SIGNIFICANT CHANGE UP
MONOCYTES # BLD AUTO: 0.72 K/UL — SIGNIFICANT CHANGE UP (ref 0–0.9)
MONOCYTES NFR BLD AUTO: 7.1 % — SIGNIFICANT CHANGE UP (ref 2–14)
NEUTROPHILS # BLD AUTO: 5.98 K/UL — SIGNIFICANT CHANGE UP (ref 1.8–7.4)
NEUTROPHILS NFR BLD AUTO: 58.8 % — SIGNIFICANT CHANGE UP (ref 43–77)
NRBC # BLD: 0 /100 WBCS — SIGNIFICANT CHANGE UP (ref 0–0)
NT-PROBNP SERPL-SCNC: 94 PG/ML — SIGNIFICANT CHANGE UP (ref 0–300)
OPIATES UR-MCNC: NEGATIVE — SIGNIFICANT CHANGE UP
OXYCODONE UR-MCNC: NEGATIVE — SIGNIFICANT CHANGE UP
PCP SPEC-MCNC: SIGNIFICANT CHANGE UP
PCP UR-MCNC: NEGATIVE — SIGNIFICANT CHANGE UP
PLATELET # BLD AUTO: 328 K/UL — SIGNIFICANT CHANGE UP (ref 150–400)
POTASSIUM SERPL-MCNC: 4.1 MMOL/L — SIGNIFICANT CHANGE UP (ref 3.5–5.3)
POTASSIUM SERPL-SCNC: 4.1 MMOL/L — SIGNIFICANT CHANGE UP (ref 3.5–5.3)
PROT SERPL-MCNC: 7.7 G/DL — SIGNIFICANT CHANGE UP (ref 6–8.3)
PROTHROM AB SERPL-ACNC: 12.4 SEC — SIGNIFICANT CHANGE UP (ref 10.5–13.4)
PROTHROM AB SERPL-ACNC: 13.7 SEC — HIGH (ref 10.5–13.4)
RBC # BLD: 5.59 M/UL — SIGNIFICANT CHANGE UP (ref 4.2–5.8)
RBC # FLD: 12.9 % — SIGNIFICANT CHANGE UP (ref 10.3–14.5)
RH IG SCN BLD-IMP: POSITIVE — SIGNIFICANT CHANGE UP
SARS-COV-2 RNA SPEC QL NAA+PROBE: SIGNIFICANT CHANGE UP
SODIUM SERPL-SCNC: 142 MMOL/L — SIGNIFICANT CHANGE UP (ref 135–145)
THC UR QL: POSITIVE
TROPONIN I, HIGH SENSITIVITY RESULT: 185.3 NG/L — HIGH
TROPONIN I, HIGH SENSITIVITY RESULT: 207.1 NG/L — HIGH
TROPONIN T, HIGH SENSITIVITY RESULT: 84 NG/L — HIGH (ref 0–51)
TROPONIN T, HIGH SENSITIVITY RESULT: 87 NG/L — HIGH (ref 0–51)
WBC # BLD: 10.17 K/UL — SIGNIFICANT CHANGE UP (ref 3.8–10.5)
WBC # FLD AUTO: 10.17 K/UL — SIGNIFICANT CHANGE UP (ref 3.8–10.5)

## 2022-04-09 PROCEDURE — 36415 COLL VENOUS BLD VENIPUNCTURE: CPT

## 2022-04-09 PROCEDURE — 99285 EMERGENCY DEPT VISIT HI MDM: CPT

## 2022-04-09 PROCEDURE — 71045 X-RAY EXAM CHEST 1 VIEW: CPT

## 2022-04-09 PROCEDURE — 93010 ELECTROCARDIOGRAM REPORT: CPT | Mod: 77

## 2022-04-09 PROCEDURE — 83735 ASSAY OF MAGNESIUM: CPT

## 2022-04-09 PROCEDURE — 85025 COMPLETE CBC W/AUTO DIFF WBC: CPT

## 2022-04-09 PROCEDURE — 80053 COMPREHEN METABOLIC PANEL: CPT

## 2022-04-09 PROCEDURE — 83880 ASSAY OF NATRIURETIC PEPTIDE: CPT

## 2022-04-09 PROCEDURE — 71045 X-RAY EXAM CHEST 1 VIEW: CPT | Mod: 26

## 2022-04-09 PROCEDURE — 83690 ASSAY OF LIPASE: CPT

## 2022-04-09 PROCEDURE — 87635 SARS-COV-2 COVID-19 AMP PRB: CPT

## 2022-04-09 PROCEDURE — 99285 EMERGENCY DEPT VISIT HI MDM: CPT | Mod: 25

## 2022-04-09 PROCEDURE — 85610 PROTHROMBIN TIME: CPT

## 2022-04-09 PROCEDURE — 93005 ELECTROCARDIOGRAM TRACING: CPT

## 2022-04-09 PROCEDURE — 85730 THROMBOPLASTIN TIME PARTIAL: CPT

## 2022-04-09 PROCEDURE — 93306 TTE W/DOPPLER COMPLETE: CPT

## 2022-04-09 PROCEDURE — 99223 1ST HOSP IP/OBS HIGH 75: CPT

## 2022-04-09 PROCEDURE — 99222 1ST HOSP IP/OBS MODERATE 55: CPT

## 2022-04-09 PROCEDURE — ZZZZZ: CPT

## 2022-04-09 PROCEDURE — 96374 THER/PROPH/DIAG INJ IV PUSH: CPT | Mod: XU

## 2022-04-09 PROCEDURE — 84484 ASSAY OF TROPONIN QUANT: CPT

## 2022-04-09 PROCEDURE — 93306 TTE W/DOPPLER COMPLETE: CPT | Mod: 26

## 2022-04-09 PROCEDURE — 93010 ELECTROCARDIOGRAM REPORT: CPT

## 2022-04-09 PROCEDURE — 93010 ELECTROCARDIOGRAM REPORT: CPT | Mod: NC

## 2022-04-09 RX ORDER — SODIUM CHLORIDE 9 MG/ML
1000 INJECTION, SOLUTION INTRAVENOUS
Refills: 0 | Status: DISCONTINUED | OUTPATIENT
Start: 2022-04-09 | End: 2022-04-12

## 2022-04-09 RX ORDER — INSULIN LISPRO 100/ML
VIAL (ML) SUBCUTANEOUS
Refills: 0 | Status: DISCONTINUED | OUTPATIENT
Start: 2022-04-09 | End: 2022-04-12

## 2022-04-09 RX ORDER — HEPARIN SODIUM 5000 [USP'U]/ML
6000 INJECTION INTRAVENOUS; SUBCUTANEOUS EVERY 6 HOURS
Refills: 0 | Status: DISCONTINUED | OUTPATIENT
Start: 2022-04-09 | End: 2022-04-13

## 2022-04-09 RX ORDER — LANOLIN ALCOHOL/MO/W.PET/CERES
3 CREAM (GRAM) TOPICAL AT BEDTIME
Refills: 0 | Status: DISCONTINUED | OUTPATIENT
Start: 2022-04-09 | End: 2022-04-12

## 2022-04-09 RX ORDER — INSULIN LISPRO 100/ML
VIAL (ML) SUBCUTANEOUS AT BEDTIME
Refills: 0 | Status: DISCONTINUED | OUTPATIENT
Start: 2022-04-09 | End: 2022-04-12

## 2022-04-09 RX ORDER — HEPARIN SODIUM 5000 [USP'U]/ML
INJECTION INTRAVENOUS; SUBCUTANEOUS
Qty: 25000 | Refills: 0 | Status: DISCONTINUED | OUTPATIENT
Start: 2022-04-09 | End: 2022-04-11

## 2022-04-09 RX ORDER — HEPARIN SODIUM 5000 [USP'U]/ML
5000 INJECTION INTRAVENOUS; SUBCUTANEOUS ONCE
Refills: 0 | Status: COMPLETED | OUTPATIENT
Start: 2022-04-09 | End: 2022-04-09

## 2022-04-09 RX ORDER — DEXTROSE 50 % IN WATER 50 %
25 SYRINGE (ML) INTRAVENOUS ONCE
Refills: 0 | Status: DISCONTINUED | OUTPATIENT
Start: 2022-04-09 | End: 2022-04-12

## 2022-04-09 RX ORDER — HEPARIN SODIUM 5000 [USP'U]/ML
4000 INJECTION INTRAVENOUS; SUBCUTANEOUS EVERY 6 HOURS
Refills: 0 | Status: DISCONTINUED | OUTPATIENT
Start: 2022-04-09 | End: 2022-04-11

## 2022-04-09 RX ORDER — ACETAMINOPHEN 500 MG
650 TABLET ORAL EVERY 6 HOURS
Refills: 0 | Status: DISCONTINUED | OUTPATIENT
Start: 2022-04-09 | End: 2022-04-12

## 2022-04-09 RX ORDER — CLOPIDOGREL BISULFATE 75 MG/1
75 TABLET, FILM COATED ORAL DAILY
Refills: 0 | Status: DISCONTINUED | OUTPATIENT
Start: 2022-04-10 | End: 2022-04-12

## 2022-04-09 RX ORDER — ONDANSETRON 8 MG/1
4 TABLET, FILM COATED ORAL EVERY 8 HOURS
Refills: 0 | Status: DISCONTINUED | OUTPATIENT
Start: 2022-04-09 | End: 2022-04-12

## 2022-04-09 RX ORDER — ASPIRIN/CALCIUM CARB/MAGNESIUM 324 MG
81 TABLET ORAL DAILY
Refills: 0 | Status: DISCONTINUED | OUTPATIENT
Start: 2022-04-10 | End: 2022-04-12

## 2022-04-09 RX ORDER — CLOPIDOGREL BISULFATE 75 MG/1
300 TABLET, FILM COATED ORAL ONCE
Refills: 0 | Status: COMPLETED | OUTPATIENT
Start: 2022-04-09 | End: 2022-04-09

## 2022-04-09 RX ORDER — NITROGLYCERIN 6.5 MG
0.4 CAPSULE, EXTENDED RELEASE ORAL ONCE
Refills: 0 | Status: COMPLETED | OUTPATIENT
Start: 2022-04-09 | End: 2022-04-09

## 2022-04-09 RX ORDER — HEPARIN SODIUM 5000 [USP'U]/ML
INJECTION INTRAVENOUS; SUBCUTANEOUS
Qty: 25000 | Refills: 0 | Status: DISCONTINUED | OUTPATIENT
Start: 2022-04-09 | End: 2022-04-13

## 2022-04-09 RX ORDER — DEXTROSE 50 % IN WATER 50 %
15 SYRINGE (ML) INTRAVENOUS ONCE
Refills: 0 | Status: DISCONTINUED | OUTPATIENT
Start: 2022-04-09 | End: 2022-04-12

## 2022-04-09 RX ORDER — GLUCAGON INJECTION, SOLUTION 0.5 MG/.1ML
1 INJECTION, SOLUTION SUBCUTANEOUS ONCE
Refills: 0 | Status: DISCONTINUED | OUTPATIENT
Start: 2022-04-09 | End: 2022-04-12

## 2022-04-09 RX ORDER — DEXTROSE 50 % IN WATER 50 %
12.5 SYRINGE (ML) INTRAVENOUS ONCE
Refills: 0 | Status: DISCONTINUED | OUTPATIENT
Start: 2022-04-09 | End: 2022-04-12

## 2022-04-09 RX ORDER — ASPIRIN/CALCIUM CARB/MAGNESIUM 324 MG
324 TABLET ORAL ONCE
Refills: 0 | Status: COMPLETED | OUTPATIENT
Start: 2022-04-09 | End: 2022-04-09

## 2022-04-09 RX ADMIN — CLOPIDOGREL BISULFATE 300 MILLIGRAM(S): 75 TABLET, FILM COATED ORAL at 15:03

## 2022-04-09 RX ADMIN — CLOPIDOGREL BISULFATE 300 MILLIGRAM(S): 75 TABLET, FILM COATED ORAL at 14:09

## 2022-04-09 RX ADMIN — HEPARIN SODIUM 5000 UNIT(S): 5000 INJECTION INTRAVENOUS; SUBCUTANEOUS at 15:10

## 2022-04-09 RX ADMIN — HEPARIN SODIUM 0 UNIT(S)/HR: 5000 INJECTION INTRAVENOUS; SUBCUTANEOUS at 18:21

## 2022-04-09 RX ADMIN — Medication 324 MILLIGRAM(S): at 13:13

## 2022-04-09 RX ADMIN — HEPARIN SODIUM 1000 UNIT(S)/HR: 5000 INJECTION INTRAVENOUS; SUBCUTANEOUS at 15:08

## 2022-04-09 RX ADMIN — HEPARIN SODIUM 1000 UNIT(S)/HR: 5000 INJECTION INTRAVENOUS; SUBCUTANEOUS at 17:12

## 2022-04-09 RX ADMIN — HEPARIN SODIUM 700 UNIT(S)/HR: 5000 INJECTION INTRAVENOUS; SUBCUTANEOUS at 19:39

## 2022-04-09 RX ADMIN — Medication 0.4 MILLIGRAM(S): at 15:03

## 2022-04-09 NOTE — ED PROVIDER NOTE - CLINICAL SUMMARY MEDICAL DECISION MAKING FREE TEXT BOX
52M  presents to the ED c/o chest pain. Patient states on Wednesday night he went out and had alcohol. The next day he felt sick (as if he drank too much). He had couple episodes of vomiting but had feeling of discomfort in his chest that he noticed. It got better but then he realized that since then, with exertion, he would have onset of chest discomfort - and describes it as mild. Today, he was walking with his son to his hockey practice and on their way to the facility, with ambulation and exertion, he felt the chest pain again but it was at its worst today. It improves with rest. He then walked again and the same occurred. Concerning to him, he and his wife came to the ED today.     PMH : HLD DM.   No significant family history for CAD (grandmother?)  Pt denies paresthesias, denies new SOB (he states he has a chronic SOB due to sinus issues that are chronic), no new dizziness.     Exam as described. Story concerning for cardiac etiology. Will give ASA. Labs with trop and CXR. EKG reviewed. 52M  presents to the ED c/o chest pain. Patient states on Wednesday night he went out and had alcohol. The next day he felt sick (as if he drank too much). He had couple episodes of vomiting but had feeling of discomfort in his chest that he noticed. It got better but then he realized that since then, with exertion, he would have onset of chest discomfort - and describes it as mild. Today, he was walking with his son to his hockey practice and on their way to the facility, with ambulation and exertion, he felt the chest pain again but it was at its worst today. It improves with rest. He then walked again and the same occurred. Concerning to him, he and his wife came to the ED today.     PMH : HLD DM.   No significant family history for CAD (grandmother?)  Pt denies paresthesias, denies new SOB (he states he has a chronic SOB due to sinus issues that are chronic), no new dizziness.     Exam as described. Story concerning for cardiac etiology. Will give ASA. Labs with trop and CXR. EKG reviewed.    Elevated trop. Trended upward. D/W Dr Duff. Came to evaluate patient. Recc txfr and Plavix/Heparin. ASA was already given.     Txfr center notified.

## 2022-04-09 NOTE — ED PROVIDER NOTE - PROGRESS NOTE DETAILS
Eron, PGY3 - pt seen by cards, states to admit to tele for likely cath tomorrow. Endorsed to hospitalist Hosptialts aware of coags  Jigar Aviles MD, Facep

## 2022-04-09 NOTE — ED PROVIDER NOTE - OBJECTIVE STATEMENT
52M PMH Hyperlipidemia (on Repatha & Ezetimibe) and DM transferred from  ED for coronary angiogram in setting of Type I NSTEMI. Pt reports new onset exertional CP x days. Was found to have uptrending troponins, EKG sinus rhythm. S/p  mg, Plavix 300mg, Heparin bolus & gtt 52M PMH Hyperlipidemia (on Repatha & Ezetimibe) and DM transferred from  ED for coronary angiogram in setting of likely Type I NSTEMI. Pt reports new onset exertional L CP x days. No associated n/v/sweats/SOB, leg swelling/pain, dizziness, LOC. Was found to have uptrending troponins at OSH, EKG sinus rhythm. S/p  mg, Plavix 300mg, Heparin bolus & gtt. Pt currently without CP.

## 2022-04-09 NOTE — ED ADULT NURSE REASSESSMENT NOTE - NS ED NURSE REASSESS COMMENT FT1
cardiology at bedside for eval pt remains pain free at this time heparing infusing as ordered family member at bedside NSR on monitor pending tele bed assignment

## 2022-04-09 NOTE — H&P ADULT - NSHPLABSRESULTS_GEN_ALL_CORE
Labs reviewed:                         16.2   10.17 )-----------( 328      ( 09 Apr 2022 12:39 )             46.7     04-09    142  |  105  |  17  ----------------------------<  118<H>  4.1   |  29  |  1.20    Ca    9.9      09 Apr 2022 12:39  Mg     2.0     04-09    TPro  7.7  /  Alb  4.3  /  TBili  0.4  /  DBili  x   /  AST  22  /  ALT  40  /  AlkPhos  80  04-09    PT/INR - ( 09 Apr 2022 17:26 )   PT: 13.7 sec;   INR: 1.18 ratio         PTT - ( 09 Apr 2022 17:26 )  PTT:122.9 sec      Troponin 84      Imaging personally reviewed:  4/9/22 TTE:    Summary:   1. Technically difficult study with poor endocardial visualization.   2. Normal global left ventricular systolic function.   3. Left ventricular ejection fraction, by visual estimation, is 55 to  60%.   4. Mildly increased LV wall thickness.   5. Normal left ventricular internal cavity size.   6. The right ventricle is not well visualized, appears to have normal systolic function.   7. The left atrium is normal in size.   8. The right atrium is normal in size.   9. Mild thickening and calcification of the anterior and posterior  mitral valve leaflets.  10. Mild mitral valve regurgitation.  11. No aortic valve stenosis.  12. There is no evidence of pericardial effusion.    ECG reviewed and interpreted: NSR with no ischemic changes

## 2022-04-09 NOTE — ED PROVIDER NOTE - CARE PLAN
1 Principal Discharge DX:	Chest pain in adult  Secondary Diagnosis:	Elevated troponin   Principal Discharge DX:	NSTEMI (non-ST elevation myocardial infarction)  Secondary Diagnosis:	Elevated troponin  Secondary Diagnosis:	Chest pain in adult

## 2022-04-09 NOTE — ED PROVIDER NOTE - OBJECTIVE STATEMENT
52M  presents to the ED c/o chest pain. Patient states on Wednesday night he went out and had alcohol. The next day he felt sick (as if he drank too much). He had couple episodes of vomiting but had feeling of discomfort in his chest that he noticed. It got better but then he realized that since then, with exertion, he would have onset of chest discomfort - and describes it as mild. Today, he was walking with his son to his hockey practice and on their way to the facility, with ambulation and exertion, he felt the chest pain again but it was at its worst today. It improves with rest. He then walked again and the same occurred. Concerning to him, he and his wife came to the ED today.     PMH : HLD DM.   No significant family history for CAD (grandmother?)  Pt denies paresthesias, denies new SOB (he states he has a chronic SOB due to sinus issues that are chronic), no new dizziness.

## 2022-04-09 NOTE — CHART NOTE - NSCHARTNOTEFT_GEN_A_CORE
Routine Rounding on pt.    51 yo male with PMH of HTN, HLD, nonobstructive CAD, T2DM who presents with chest tightness x 3-4 days. Patient states he was on a business trip in [place] last week with transient episode of substernal chest tightness with exertion that improved with rest. He began to notice during the middle of the week, he starting to experience more frequent episodes of substernal chest tightness, non-radiating, varying intensity 5-8/10 with minimal exertion. He states he had episodes akin to this previously and had cardiac work-up in the past with decision made for medical management. However, today he was picking up his son from hockey practice when the chest tightness again began with exertion, but did not resolve with rest and continued to worsen during the car ride home. When he arrived home, chest tightness peaked at 8/10 in severity with new dizziness and lightheaded prompting him to present to ED. No headache, blurred vision, diaphoresis, dyspnea, n/v, numbness, nor tingling.    In the ED, vitals stable. Afebrile, HR 70s, SBP 160s. Labs notable for trop 84. EKG with non-ischemic changes. Chest pain improved with initiation of DAPT and hep gtt thus transferred to [facility] ED for Cardiology eval for cardiac cath. Admitted to medicine for NSTEMI.     Of note, per Cardiology consult note:" patient reports that he did use cocaine at the work retreat (which he requests that we do not mention to his family), medicinal marijuana use, but no smoking. He had drinks at his work retreat but he usually does not drink".  Wife was present at time of my exam in the ED so he did not disclose this information to me.    Admitted for NSTEMI  Heparin gtt presently running at 10ml/hr  ASP/Plavix initiated  Plan for [facility] 4/10/2022 AM    Troponin x2 84, 87  EKG NSR at 64 bpm, QTC 402ms in ED    EKG q4hrs 10:30pm 4/9/2022 NSR at 75 bpm, QTC 426ms  NSR at 75bpm, QTC 426ms  Troponin x3 pending.    Pt seen and evaluated at bedside. Pt reporting improvement in substernal chest tightness, and with no acute complaints presently.  Discussed with pt importance of routine Cardiac Enzyme draws and Serial EKG's q4hrs; pt refusing 2:30 AM blood work and EKG, but is amenable to 6am Troponin draw and EKG.  Discussed with pt at length risks of refusal of medical care; pt is A&Ox4 and still refusing.  Will monitor VS closely; worsening/continued chest pain.  Endorsed to RN  Will endorse to AM team Routine Rounding on pt.    51 yo male with PMH of HTN, HLD, nonobstructive CAD, T2DM who presents with chest tightness x 3-4 days. Patient states he was on a business trip in [place] last week with transient episode of substernal chest tightness with exertion that improved with rest. He began to notice during the middle of the week, he starting to experience more frequent episodes of substernal chest tightness, non-radiating, varying intensity 5-8/10 with minimal exertion. He states he had episodes akin to this previously and had cardiac work-up in the past with decision made for medical management. However, today he was picking up his son from hockey practice when the chest tightness again began with exertion, but did not resolve with rest and continued to worsen during the car ride home. When he arrived home, chest tightness peaked at 8/10 in severity with new dizziness and lightheaded prompting him to present to ED. No headache, blurred vision, diaphoresis, dyspnea, n/v, numbness, nor tingling.    In the ED, vitals stable. Afebrile, HR 70s, SBP 160s. Labs notable for trop 84. EKG with non-ischemic changes. Chest pain improved with initiation of DAPT and hep gtt thus transferred to [facility] ED for Cardiology eval for cardiac cath. Admitted to medicine for NSTEMI.     Of note, per Cardiology consult note:" patient reports that he did use cocaine at the work retreat (which he requests that we do not mention to his family), medicinal marijuana use, but no smoking. He had drinks at his work retreat but he usually does not drink".  Wife was present at time of my exam in the ED so he did not disclose this information to me.    Admitted for NSTEMI  Heparin gtt presently running at 10ml/hr  ASP/Plavix initiated  Plan for [facility] 4/10/2022 AM    Troponin x2 84, 87  EKG NSR at 64 bpm, QTC 402ms in ED    EKG q4hrs 10:30pm 4/9/2022 NSR at 75 bpm, QTC 426ms  NSR at 75bpm, QTC 426ms  Troponin x3 73  Utox + for Cocaine and THC    Pt seen and evaluated at bedside. Pt reporting improvement in substernal chest tightness, and with no acute complaints presently.  Discussed with pt importance of routine Cardiac Enzyme draws and Serial EKG's q4hrs; pt refusing 2:30 AM blood work and EKG, but is amenable to 6am Troponin draw and EKG.  Discussed with pt at length risks of refusal of medical care; pt is A&Ox4 and still refusing.  Will monitor VS closely; worsening/continued chest pain.  Endorsed to RN  Will endorse to AM team

## 2022-04-09 NOTE — ED PROVIDER NOTE - CLINICAL SUMMARY MEDICAL DECISION MAKING FREE TEXT BOX
Eron, PGY3 - 52M PMH HLD, DM, nonsmoker transferred from  ED for NSTEMI s/p ASA/Plavix, Heparin bolus, on Heparin gtt.  Currently CP free. EKG NSR no diagnostic ischemic changes. Will d/w cards & admit

## 2022-04-09 NOTE — H&P ADULT - PROBLEM SELECTOR PLAN 2
per Cardiology and [facility] ED provider notes. patient admitted to cocaine use during business trip to other providers however asked that this information not be disclosed to family.  - wife was present bedside during time of my exam so patient did not disclose this information to me  - when wife is not present, will need to clarify last cocaine use and hx with patient  - check utox  - avoid beta blockers given cocaine use

## 2022-04-09 NOTE — H&P ADULT - HISTORY OF PRESENT ILLNESS
53 yo male with PMH of HTN, HLD, nonobstructive CAD, T2DM who presents with chest tightness x 3-4 days. Patient states he was on a business trip in [place] last week with transient episode of substernal chest tightness with exertion that improved with rest. He began to notice during the middle of the week, he starting to experience more frequent episodes of substernal chest tightness, non-radiating, varying intensity 5-8/10 with minimal exertion. He states he had episodes akin to this previously and had cardiac work-up in the past with decision made for medical management. However, today he was picking up his son from hockey practice when the chest tightness again began with exertion, but did not resolve with rest and continued to worsen during the car ride home. When he arrived home, chest tightness peaked at 8/10 in severity with new dizziness and lightheaded prompting him to present to ED. No headache, blurred vision, diaphoresis, dyspnea, n/v, numbness, nor tingling.    In the ED, vitals stable. Afebrile, HR 70s, SBP 160s. Labs notable for trop 84. EKG with non-ischemic changes. Chest pain improved with initiation of DAPT and hep gtt thus transferred to [facility] ED for Cardiology eval for cardiac cath. Admitted to medicine for NSTEMI.     Of note, per Cardiology consult note:" patient reports that he did use cocaine at the work retreat (which he requests that we do not mention to his family), medicinal marijuana use, but no smoking. He had drinks at his work retreat but he usually does not drink".  Wife was present at time of my exam in the ED so he did not disclose this information to me.

## 2022-04-09 NOTE — H&P ADULT - NSHPADDITIONALINFOADULT_GEN_ALL_CORE
.  Aylin Frank MD  Division of Hospital Medicine  [facility]   Pager: [phone]    Plan discussed with patient, wife bedside, and medicine NP Zeta.

## 2022-04-09 NOTE — CONSULT NOTE ADULT - SUBJECTIVE AND OBJECTIVE BOX
JIGAR BADILLO  [id number]      HPI:    Jigar Badillo is a 52 year old man with past medical history of Hyperlipidemia (on Repatha & Ezetimibe) and Diabetes mellitus presents with chest pain.     ALLERGIES:  No Known Allergies      PAST MEDICAL HISTORY:  DM (diabetes mellitus)  High cholesterol      CURRENT MEDICATIONS:  clopidogrel Tablet 300 milliGRAM(s) Oral Once      SOCIAL HISTORY:      FAMILY HISTORY:      ROS:  All 10 systems reviewed and positives noted in HPI    OBJECTIVE:    VITAL SIGNS:  Vital Signs Last 24 Hrs  T(C): 37.1 (09 Apr 2022 12:15), Max: 37.1 (09 Apr 2022 12:15)  T(F): 98.8 (09 Apr 2022 12:15), Max: 98.8 (09 Apr 2022 12:15)  HR: 68 (09 Apr 2022 12:17) (63 - 68)  BP: 112/79 (09 Apr 2022 12:17) (112/79 - 154/101)  BP(mean): 87 (09 Apr 2022 12:17) (87 - 87)  RR: 17 (09 Apr 2022 12:17) (17 - 19)  SpO2: 99% (09 Apr 2022 12:17) (99% - 99%)    PHYSICAL EXAM:  General: well appearing, no distress  HEENT: sclera anicteric  Neck: supple, no carotid bruits b/l  CVS: JVP ~ 7 cm H20, RRR, s1, s2, no murmurs/rubs/gallops  Chest: unlabored respirations, clear to auscultation b/l  Abdomen: non-distended  Extremities: no lower extremity edema b/l  Neuro: awake, alert & oriented x 3  Psych: normal affect      LABS:                        16.2   10.17 )-----------( 328      ( 09 Apr 2022 12:39 )             46.7     04-09    142  |  105  |  17  ----------------------------<  118<H>  4.1   |  29  |  1.20    Ca    9.9      09 Apr 2022 12:39  Mg     2.0     04-09    TPro  7.7  /  Alb  4.3  /  TBili  0.4  /  DBili  x   /  AST  22  /  ALT  40  /  AlkPhos  80  04-09        Outpatient TTE (11/2021):  LVEF 55%  Normal RV size and function  No pericardial effusion     Outpatient Exercise Treadmill stress test (11/2021):  Negative (85% MPHR)      ECG (4/9/22): normal sinus rhythm        JIGAR BADILLO  [id number]      HPI:    Jigar Badillo is a 52 year old man with past medical history of Hyperlipidemia (on Repatha & Ezetimibe) and Diabetes mellitus who presents with chest pain. The patient reports that a few days ago while away on a business trip he started to notice chest discomfort on exertion. The chest pain recurred this morning when ambulating which prompted his presentation to the ER. He denies radiation of the chest pain or shortness of breath. No leg swelling. No palpitations, presyncope or syncope.       ALLERGIES:  No Known Allergies      PAST MEDICAL HISTORY:  DM (diabetes mellitus)  High cholesterol      CURRENT MEDICATIONS:  clopidogrel Tablet 300 milliGRAM(s) Oral Once      SOCIAL HISTORY:  Denies smoking cigarettes      ROS:  All 10 systems reviewed and positives noted in HPI    OBJECTIVE:    VITAL SIGNS:  Vital Signs Last 24 Hrs  T(C): 37.1 (09 Apr 2022 12:15), Max: 37.1 (09 Apr 2022 12:15)  T(F): 98.8 (09 Apr 2022 12:15), Max: 98.8 (09 Apr 2022 12:15)  HR: 68 (09 Apr 2022 12:17) (63 - 68)  BP: 112/79 (09 Apr 2022 12:17) (112/79 - 154/101)  BP(mean): 87 (09 Apr 2022 12:17) (87 - 87)  RR: 17 (09 Apr 2022 12:17) (17 - 19)  SpO2: 99% (09 Apr 2022 12:17) (99% - 99%)    PHYSICAL EXAM:  General: middle aged man, no acute distress  HEENT: sclera anicteric  Neck: supple, no carotid bruits b/l  CVS: JVP ~ 7 cm H20, RRR, s1, s2, no murmurs/rubs/gallops  Chest: unlabored respirations, clear to auscultation b/l  Abdomen: obese  Extremities: no lower extremity edema b/l  Neuro: awake, alert & oriented x 3  Psych: normal affect      LABS:                        16.2   10.17 )-----------( 328      ( 09 Apr 2022 12:39 )             46.7     04-09    142  |  105  |  17  ----------------------------<  118<H>  4.1   |  29  |  1.20    Ca    9.9      09 Apr 2022 12:39  Mg     2.0     04-09    TPro  7.7  /  Alb  4.3  /  TBili  0.4  /  DBili  x   /  AST  22  /  ALT  40  /  AlkPhos  80  04-09        Outpatient TTE (11/2021):  LVEF 55%  Normal RV size and function  No pericardial effusion     Outpatient Exercise Treadmill stress test (11/2021):  Negative (85% MPHR)      ECG (4/9/22): normal sinus rhythm

## 2022-04-09 NOTE — ED PROVIDER NOTE - ATTENDING CONTRIBUTION TO CARE
Private Physician Yulissa Duarte  52y male pmh HLD didn't tolerate statins, DMT2 on metformin, Cad on prior ct coronary. NO Hypertension,habits, complains of  cva, covid. Pt c/o 3d of intermittent cp worse w exertion. Worsened approx noon seen at [place] ED  and transferred to [facility] for NSTEMI. Pain 8/10 improved w rest. after 5-10 min, associated w mild dizziness no radiation, No shortness of breath, abd pain nvdc,palps diaphoresis. Currently pain free. PT tx w asa/plavix/heparin pta.  PE WDWN Nad normocephalic atraumatic neck supple chest, cv no rubs, gallops or murmurs abd soft +bs no mass guarding , neuro no focal defects.  Jigar Aviles MD, Facep

## 2022-04-09 NOTE — ED PROVIDER NOTE - NS ED ROS FT
General: Denies fevers  Eyes: Denies vision changes  ENMT: Denies sore throat  CV: +chest pain  Resp: Denies SOB  GI: Denies abdominal pain, nausea, vomiting, diarrhea  : Denies painful urination  Skin: Denies new rashes  Neuro: Denies headache, focal weakness  MSK: Denies recent trauma

## 2022-04-09 NOTE — CONSULT NOTE ADULT - SUBJECTIVE AND OBJECTIVE BOX
Marshall Calvillo MD  Cardiology Fellow  [phone]  All Cardiology service information can be found 24/7 on [email or web address], password: [email or web address]    Patient seen and evaluated at bedside    Chief Complaint:    HPI:  52M with history nonobstructive CAD, HLD, T2DM presenting with acute on chronic chest pain. Pain started last week while on a business trip in the left chest at rest, then again with minimal exertion. Patient then had chest pain episodes with minimal exertion the past week until today where he had an acute episode at rest. He reports he has had this pain in the past and undergone a cardiac work up with his cardiologist and decision was made for medical management.     No fevers, chills, nausea, vomiting, diarrhea or constipation.     The patient reports that he did use cocaine at the work retreat (which he requests that we do not mention to his family), medicinal marijuana use, but no smoking. He had drinks at his work retreat but he usually does not drink. Normally has excellent ET, no palpitations, no orthopnea, no lower extremity edema.     PMHx:   DM (diabetes mellitus)    High cholesterol    COVID-19 vaccine series completed        PSHx:       Allergies:  No Known Allergies      Home Meds:    Current Medications:   heparin   Injectable 4000 Unit(s) IV Push every 6 hours PRN  heparin  Infusion.  Unit(s)/Hr IV Continuous <Continuous>      FAMILY HISTORY:      Social History:  See above     REVIEW OF SYSTEMS:  CONSTITUTIONAL: No weakness, fevers or chills  EYES/ENT: No visual changes;  No dysphagia  NECK: No pain or stiffness  RESPIRATORY: No cough, wheezing, hemoptysis; No shortness of breath  CARDIOVASCULAR: + chest pain and no palpitations; No lower extremity edema  GASTROINTESTINAL: No abdominal or epigastric pain. No nausea, vomiting, or hematemesis; No diarrhea or constipation. No melena or hematochezia.  BACK: No back pain  GENITOURINARY: No dysuria, frequency or hematuria  NEUROLOGICAL: No numbness or weakness  SKIN: No itching, burning, rashes, or lesions   All other review of systems is negative unless indicated above.    Physical Exam:  T(F): 98.7 (04-09), Max: 98.8 (04-09)  HR: 71 (04-09) (63 - 82)  BP: 164/85 (04-09) (112/79 - 164/85)  RR: 20 (04-09)  SpO2: 97% (04-09)  GENERAL: No acute distress, well-developed  HEAD:  Atraumatic, Normocephalic  ENT: No JVD,  CHEST/LUNG: Clear to auscultation bilaterally; No wheeze, equal breath sounds bilaterally   BACK: No spinal tenderness  HEART: Regular rate and rhythm; No murmurs, rubs, or gallops  ABDOMEN: Soft, Nontender, Nondistended; Bowel sounds present  EXTREMITIES:  No clubbing, cyanosis, or edema  PSYCH: Nl behavior, nl affect  NEUROLOGY: AAOx3, non-focal, cranial nerves intact  SKIN: Normal color, No rashes or lesions  LINES:    Cardiovascular Diagnostic Testing:    ECG: Personally reviewed: NSR     Echo:   4/9  Summary:   1. Technically difficult study with poor endocardial visualization.   2. Normal global left ventricular systolic function.   3. Left ventricular ejection fraction, by visual estimation, is 55 to   60%.   4. Mildly increased LV wall thickness.   5. Normal left ventricular internal cavity size.   6. The right ventricle is not well visualized, appears to have normal   systolic function.   7. The left atrium is normal in size.   8. The right atrium is normal in size.   9. Mild thickening and calcification of the anterior and posterior   mitral valve leaflets.  10. Mild mitral valve regurgitation.  11. No aortic valve stenosis.  12. There is no evidence of pericardial effusion.    [id number] Melani Duff MD Electronically signed on   4/9/2022 at 4:05:36 PM    CT coronaries 2012    Coronary Calcium Score = 40 Agatston Units (90th percentile)  IMPRESSION:  CT coronary angiography shows:  LMCA: Normal.  LAD: Eccentric mixed calcified and noncalcified plaque within the   proximal segment with mild (less than 50%) luminal narrowing.  LCx: Dominant vessel, with eccentric mixed calcified and noncalcified   plaque within the mid segment with mild (less than 50%) luminal narrowing.  RCA: Small vessel, with eccentric mixed calcified and noncalcified plaque   as above within the proximal segment demonstrating moderate (up to 70%)   luminal narrowing.    Imaging:    CXR: Personally reviewed    Labs: Personally reviewed                        16.2   10.17 )-----------( 328      ( 09 Apr 2022 12:39 )             46.7     04-09    142  |  105  |  17  ----------------------------<  118<H>  4.1   |  29  |  1.20    Ca    9.9      09 Apr 2022 12:39  Mg     2.0     04-09    TPro  7.7  /  Alb  4.3  /  TBili  0.4  /  DBili  x   /  AST  22  /  ALT  40  /  AlkPhos  80  04-09    PT/INR - ( 09 Apr 2022 14:15 )   PT: 12.4 sec;   INR: 1.07 ratio         PTT - ( 09 Apr 2022 14:15 )  PTT:35.4 sec    CARDIAC MARKERS ( 09 Apr 2022 16:33 )  84 ng/L / x     / x     / x     / x     / x            Serum Pro-Brain Natriuretic Peptide: 94 pg/mL (04-09 @ 12:39)

## 2022-04-09 NOTE — ED PROVIDER NOTE - NSICDXPASTMEDICALHX_GEN_ALL_CORE_FT
PAST MEDICAL HISTORY:  COVID-19 vaccine series completed     DM (diabetes mellitus)     High cholesterol

## 2022-04-09 NOTE — ED PROVIDER NOTE - PHYSICAL EXAMINATION
I have reviewed the triage vital signs.  Const: AAOx3, in NAD  Eyes: no conjunctival injection  HENT: NCAT  CV: RRR, +S1, S2  Resp: CTAB, no respiratory distress  GI: Abdomen soft, NTND, no guarding  Extremities: No peripheral lili  Skin: Warm, well perfused, no rash  MSK: No gross deformities appreciated  Neuro: No focal sensory or motor deficits  Psych: Appropriate mood and affect

## 2022-04-09 NOTE — ED ADULT NURSE REASSESSMENT NOTE - NS ED NURSE REASSESS COMMENT FT1
pt with ptt 122.9 heparin drip stopped at 1808 for 1 hr to be restarted at 700 units per hr according to protocol at 1908

## 2022-04-09 NOTE — H&P ADULT - PROBLEM SELECTOR PLAN 1
chest tightness with progression from with exertion to now at rest as well. improved after initiating DAPT + hep gtt  - EKG NSR with no ischemic changes  - trop 84  - trend trop with EKG to peak. next due 18:30. Cardiology to be notified if any acute changes  - s/p ASA + plavix load at OSH  - c/w DAPT, hep gtt  - Cardiology consult appreciated  - plan for [facility]   - hold off on beta blocker until confirm last cocaine use chest tightness with progression from with exertion to now at rest as well. improved after initiating DAPT + hep gtt  - EKG NSR with no ischemic changes  - trop 84  - trend trop with EKG to peak. next due 18:30. Cardiology to be notified if any acute changes  - s/p ASA + plavix load at OSH  - c/w DAPT, hep gtt  - TTE with normal LVSF, EF 55-60%, no WMA, mild thickening+calcification of MV leaflets noted  - Cardiology consult appreciated  - plan for [facility]   - hold off on beta blocker until confirm last cocaine use  - check HbA1c, lipid panel, TSH

## 2022-04-09 NOTE — H&P ADULT - NSHPPHYSICALEXAM_GEN_ALL_CORE
CONSTITUTIONAL: NAD, well-developed, well-groomed  EYES: PERRLA; conjunctiva and sclera clear  ENMT: Moist oral mucosa, no pharyngeal injection or exudates; normal dentition  NECK: Supple, no palpable masses; no thyromegaly  RESPIRATORY: Normal respiratory effort; lungs are clear to auscultation bilaterally  CARDIOVASCULAR: Regular rate and rhythm, normal S1 and S2, no murmur/rub/gallop; No lower extremity edema; Peripheral pulses are 2+ bilaterally  ABDOMEN: Soft, Non-distended,  Nontender to palpation, normoactive bowel sounds  MUSCULOSKELETAL:  No clubbing or cyanosis of digits; no joint swelling or tenderness to palpation  PSYCH: A+O to person, place, and time; affect appropriate  NEUROLOGY: CN 2-12 are intact and symmetric; no gross sensory deficits   SKIN: No rashes; no palpable lesions

## 2022-04-09 NOTE — H&P ADULT - ASSESSMENT
TTE with normal LVSF, EF 55-60%, no WMA, mild thickening/calcification of MV leaflets 51 yo male with PMH of HTN, HLD, nonobstructive CAD, T2DM who presents with chest tightness x 3-4 days that progress from with exertion to now at rest admitted for NSTEMI planned for cardiac cath on 4/10.

## 2022-04-09 NOTE — CONSULT NOTE ADULT - ASSESSMENT
Assessment:  Jigar Matamoros is a 52 year old man with past medical history of Hyperlipidemia (on Repatha & Ezetimibe) and Diabetes mellitus presents with chest pain.  Assessment:  Jigar Matamoros is a 52 year old man with past medical history of Hyperlipidemia (on Repatha & Ezetimibe) and Diabetes mellitus presents with new onset exertional chest pain, found to have uptrending troponins concerning for Type I NSTEMI.    ECG consistent with sinus rhythm. Troponins trending upwards. Prior echocardiogram from 11/2021 consistent with normal LVEF and prior exercise treadmill stress test unremarkable from 11/2021. Patient with mild chest discomfort at rest and so would recommend coronary angiogram at this time.    Recommendations:  [] NSTEMI; Likely Type 1. Dose Aspirin 325 mg PO once and Plavix 300 mg PO once. Continue Aspirin 81 mg daily & Plavix 75 mg daily. Start heparin drip for acute coronary syndrome. Dose Metoprolol tartrate 25 mg PO BID. Will check echocardiogram to evaluate for LV wall motion abnormalities. Recommend coronary angiogram to evaluate for CAD, discussed risks/benefits/alternatives with patient and he agrees to proceed, please keep NPO.    Discussed with ER attending, Dr. Escoto and arranging for transfer to [facility] for coronary angiogram.    Melani Duff MD  Cardiology

## 2022-04-09 NOTE — ED ADULT NURSE NOTE - OBJECTIVE STATEMENT
52 yr old male ambulatory to ED for complaints of left sided intermittent chest pain x 4 days. Pt denies shortness of breath, nausea, vomiting. PMHX: Hyperlipidemia, Obesity.

## 2022-04-09 NOTE — ED ADULT NURSE NOTE - OBJECTIVE STATEMENT
pt 51 yo male transferred from [place] ER for NSTEMI pre arrival plavix 600mg asa 324mg given and pt arrived with heparin drip infusing to right AC pt alert oriented on arrival pt denies any pain on arrival skin warm dry pt placed on cardiac monitoring NSR bilateral clear breath sounds ascultated motor and sensory intact to extremities

## 2022-04-09 NOTE — ED ADULT NURSE NOTE - NSIMPLEMENTINTERV_GEN_ALL_ED
Implemented All Universal Safety Interventions:  [place] to call system. Call bell, personal items and telephone within reach. Instruct patient to call for assistance. Room bathroom lighting operational. Non-slip footwear when patient is off stretcher. Physically safe environment: no spills, clutter or unnecessary equipment. Stretcher in lowest position, wheels locked, appropriate side rails in place.

## 2022-04-09 NOTE — H&P ADULT - NSHPSOCIALHISTORY_GEN_ALL_CORE
denies cigarette smoking  smokes marijuana every evening  denies drug use to me as wife was present during exam, but reported one time use of cocaine while away on his business trip

## 2022-04-09 NOTE — H&P ADULT - NSHPREVIEWOFSYSTEMS_GEN_ALL_CORE
CONSTITUTIONAL: No fever, weight loss, or fatigue, +lightheadedness/dizziness - now resolved  EYES: No eye pain, visual disturbances, or discharge  ENMT:  No difficulty hearing, tinnitus, vertigo; No sinus or throat pain  NECK: No pain or stiffness  RESPIRATORY: No cough, wheezing, chills or hemoptysis; No shortness of breath  CARDIOVASCULAR: +substernal chest tightness, non-radiating 5-8/10 in severity; No palpitations, dizziness, or leg swelling  GASTROINTESTINAL: No abdominal or epigastric pain. No nausea, vomiting, or hematemesis; No diarrhea or constipation. No melena or hematochezia.  GENITOURINARY: No dysuria, frequency, hematuria, or incontinence  NEUROLOGICAL: No headaches, memory loss, loss of strength, numbness, or tremors  SKIN: No itching, burning, rashes, or lesions   LYMPH NODES: No enlarged glands  ENDOCRINE: No heat or cold intolerance; No hair loss  MUSCULOSKELETAL: No joint pain or swelling; No muscle, back, or extremity pain  PSYCHIATRIC: No depression, anxiety, mood swings, or difficulty sleeping  HEME/LYMPH: No easy bruising, or bleeding gums

## 2022-04-09 NOTE — H&P ADULT - PROBLEM SELECTOR PLAN 4
on metformin 500mg daily at home.  - check HbA1c  - low dose sliding scale  - add basal/bolus if needed  - monitor FS qAC + qHS  - carb consistent diet

## 2022-04-09 NOTE — H&P ADULT - PROBLEM SELECTOR PLAN 5
DVT ppx: on hep gtt    PCP Dr. Reuben Duenas e-mailed on 4/9 to notify him of his patient's admission.

## 2022-04-09 NOTE — CONSULT NOTE ADULT - ASSESSMENT
52M with history of HLD, non obstructive CAD, T2DM presenting with worsening chest pain, initially with minimal exertion and now at rest. Of note he did take cocaine ~1 week ago which he reports he does not usually take (please do not share this information with family), and uses medicinal marijuana. Patient transferred from outside hospital for continual chest pain which was relieved after heparin and DAPT.     #NSTEMI   -Would c/w DAPT, Heparin   -Please obtain lipid panel, hemoglobin a1c, TSH  -Please obtain UDS  -Would refrain from using BB until confirming last cocaine dose and that patient with c/t abstinent (does not seem like a chronic user)   -Likely LHC in AM  -Please trend cardiac enzymes and ekg q4-6h, call cardiology for any acute changes  -If liver tests WNL, start atorvastatin 80 mg

## 2022-04-09 NOTE — H&P ADULT - PROBLEM SELECTOR PLAN 3
not on statin as has been intolerant of statins previously due to severe myalgias. insurance had denied repatha as outpatient. currently on zetia 10mg qHS only.  - resume zetia 10mg qHS as outpatient  - check lipid panel   - may need to re-trial on statin given above findings pending cath results

## 2022-04-10 ENCOUNTER — TRANSCRIPTION ENCOUNTER (OUTPATIENT)
Age: 52
End: 2022-04-10

## 2022-04-10 LAB
A1C WITH ESTIMATED AVERAGE GLUCOSE RESULT: 6.2 % — HIGH (ref 4–5.6)
ANION GAP SERPL CALC-SCNC: 14 MMOL/L — SIGNIFICANT CHANGE UP (ref 5–17)
APTT BLD: 33.5 SEC — SIGNIFICANT CHANGE UP (ref 27.5–35.5)
APTT BLD: 34.7 SEC — SIGNIFICANT CHANGE UP (ref 27.5–35.5)
APTT BLD: 38.1 SEC — HIGH (ref 27.5–35.5)
APTT BLD: >200 SEC — CRITICAL HIGH (ref 27.5–35.5)
BUN SERPL-MCNC: 15 MG/DL — SIGNIFICANT CHANGE UP (ref 7–23)
CALCIUM SERPL-MCNC: 9.2 MG/DL — SIGNIFICANT CHANGE UP (ref 8.4–10.5)
CHLORIDE SERPL-SCNC: 103 MMOL/L — SIGNIFICANT CHANGE UP (ref 96–108)
CHOLEST SERPL-MCNC: 177 MG/DL — SIGNIFICANT CHANGE UP
CO2 SERPL-SCNC: 22 MMOL/L — SIGNIFICANT CHANGE UP (ref 22–31)
CREAT SERPL-MCNC: 1.08 MG/DL — SIGNIFICANT CHANGE UP (ref 0.5–1.3)
EGFR: 84 ML/MIN/1.73M2 — SIGNIFICANT CHANGE UP
ESTIMATED AVERAGE GLUCOSE: 131 MG/DL — HIGH (ref 68–114)
GLUCOSE BLDC GLUCOMTR-MCNC: 105 MG/DL — HIGH (ref 70–99)
GLUCOSE BLDC GLUCOMTR-MCNC: 129 MG/DL — HIGH (ref 70–99)
GLUCOSE BLDC GLUCOMTR-MCNC: 130 MG/DL — HIGH (ref 70–99)
GLUCOSE BLDC GLUCOMTR-MCNC: 144 MG/DL — HIGH (ref 70–99)
GLUCOSE SERPL-MCNC: 140 MG/DL — HIGH (ref 70–99)
HCT VFR BLD CALC: 43.3 % — SIGNIFICANT CHANGE UP (ref 39–50)
HCT VFR BLD CALC: 46.8 % — SIGNIFICANT CHANGE UP (ref 39–50)
HDLC SERPL-MCNC: 33 MG/DL — LOW
HGB BLD-MCNC: 14.6 G/DL — SIGNIFICANT CHANGE UP (ref 13–17)
HGB BLD-MCNC: 16.1 G/DL — SIGNIFICANT CHANGE UP (ref 13–17)
LIPID PNL WITH DIRECT LDL SERPL: 73 MG/DL — SIGNIFICANT CHANGE UP
MAGNESIUM SERPL-MCNC: 1.9 MG/DL — SIGNIFICANT CHANGE UP (ref 1.6–2.6)
MCHC RBC-ENTMCNC: 27.9 PG — SIGNIFICANT CHANGE UP (ref 27–34)
MCHC RBC-ENTMCNC: 28.5 PG — SIGNIFICANT CHANGE UP (ref 27–34)
MCHC RBC-ENTMCNC: 33.7 GM/DL — SIGNIFICANT CHANGE UP (ref 32–36)
MCHC RBC-ENTMCNC: 34.4 GM/DL — SIGNIFICANT CHANGE UP (ref 32–36)
MCV RBC AUTO: 82.6 FL — SIGNIFICANT CHANGE UP (ref 80–100)
MCV RBC AUTO: 83 FL — SIGNIFICANT CHANGE UP (ref 80–100)
NON HDL CHOLESTEROL: 144 MG/DL — HIGH
NRBC # BLD: 0 /100 WBCS — SIGNIFICANT CHANGE UP (ref 0–0)
NRBC # BLD: 0 /100 WBCS — SIGNIFICANT CHANGE UP (ref 0–0)
PHOSPHATE SERPL-MCNC: 3 MG/DL — SIGNIFICANT CHANGE UP (ref 2.5–4.5)
PLATELET # BLD AUTO: 298 K/UL — SIGNIFICANT CHANGE UP (ref 150–400)
PLATELET # BLD AUTO: 299 K/UL — SIGNIFICANT CHANGE UP (ref 150–400)
POTASSIUM SERPL-MCNC: 3.9 MMOL/L — SIGNIFICANT CHANGE UP (ref 3.5–5.3)
POTASSIUM SERPL-SCNC: 3.9 MMOL/L — SIGNIFICANT CHANGE UP (ref 3.5–5.3)
RBC # BLD: 5.24 M/UL — SIGNIFICANT CHANGE UP (ref 4.2–5.8)
RBC # BLD: 5.64 M/UL — SIGNIFICANT CHANGE UP (ref 4.2–5.8)
RBC # FLD: 12.7 % — SIGNIFICANT CHANGE UP (ref 10.3–14.5)
RBC # FLD: 12.8 % — SIGNIFICANT CHANGE UP (ref 10.3–14.5)
SODIUM SERPL-SCNC: 139 MMOL/L — SIGNIFICANT CHANGE UP (ref 135–145)
TRIGL SERPL-MCNC: 352 MG/DL — HIGH
TROPONIN T, HIGH SENSITIVITY RESULT: 71 NG/L — HIGH (ref 0–51)
TROPONIN T, HIGH SENSITIVITY RESULT: 73 NG/L — HIGH (ref 0–51)
TROPONIN T, HIGH SENSITIVITY RESULT: 77 NG/L — HIGH (ref 0–51)
TROPONIN T, HIGH SENSITIVITY RESULT: 78 NG/L — HIGH (ref 0–51)
TROPONIN T, HIGH SENSITIVITY RESULT: 81 NG/L — HIGH (ref 0–51)
TSH SERPL-MCNC: 3.07 UIU/ML — SIGNIFICANT CHANGE UP (ref 0.27–4.2)
WBC # BLD: 10.98 K/UL — HIGH (ref 3.8–10.5)
WBC # BLD: 9.12 K/UL — SIGNIFICANT CHANGE UP (ref 3.8–10.5)
WBC # FLD AUTO: 10.98 K/UL — HIGH (ref 3.8–10.5)
WBC # FLD AUTO: 9.12 K/UL — SIGNIFICANT CHANGE UP (ref 3.8–10.5)

## 2022-04-10 PROCEDURE — 99233 SBSQ HOSP IP/OBS HIGH 50: CPT

## 2022-04-10 PROCEDURE — 93010 ELECTROCARDIOGRAM REPORT: CPT | Mod: 76,77

## 2022-04-10 PROCEDURE — 93010 ELECTROCARDIOGRAM REPORT: CPT

## 2022-04-10 RX ADMIN — HEPARIN SODIUM 1300 UNIT(S)/HR: 5000 INJECTION INTRAVENOUS; SUBCUTANEOUS at 23:01

## 2022-04-10 RX ADMIN — Medication 81 MILLIGRAM(S): at 12:08

## 2022-04-10 RX ADMIN — HEPARIN SODIUM 4000 UNIT(S): 5000 INJECTION INTRAVENOUS; SUBCUTANEOUS at 23:01

## 2022-04-10 RX ADMIN — HEPARIN SODIUM 4000 UNIT(S): 5000 INJECTION INTRAVENOUS; SUBCUTANEOUS at 15:13

## 2022-04-10 RX ADMIN — HEPARIN SODIUM 1000 UNIT(S)/HR: 5000 INJECTION INTRAVENOUS; SUBCUTANEOUS at 15:06

## 2022-04-10 RX ADMIN — HEPARIN SODIUM 0 UNIT(S)/HR: 5000 INJECTION INTRAVENOUS; SUBCUTANEOUS at 01:37

## 2022-04-10 RX ADMIN — CLOPIDOGREL BISULFATE 75 MILLIGRAM(S): 75 TABLET, FILM COATED ORAL at 12:08

## 2022-04-10 RX ADMIN — HEPARIN SODIUM 700 UNIT(S)/HR: 5000 INJECTION INTRAVENOUS; SUBCUTANEOUS at 08:25

## 2022-04-10 RX ADMIN — HEPARIN SODIUM 400 UNIT(S)/HR: 5000 INJECTION INTRAVENOUS; SUBCUTANEOUS at 01:42

## 2022-04-10 NOTE — CHART NOTE - NSCHARTNOTEFT_GEN_A_CORE
Pt has serial ekg and trop ongoing q4h, 12pm ekg noted to have t wave inversions in v1, v5 and v6 with no new chest pain. Troponins flat. cardiology aware. pt on schedule for [facility] tomorrow. continue serial ekg and trop q4h and prn for new/worsening chest pain as d/w cardiology.    Luma HEDRICKCNP-BC

## 2022-04-10 NOTE — PROVIDER CONTACT NOTE (OTHER) - SITUATION
Patient stated "if he does not go to cath lab today, he will sign out AMA".
Patient complains of chest pain.

## 2022-04-10 NOTE — PROGRESS NOTE ADULT - PROBLEM SELECTOR PLAN 2
patient admits to snorting cocaine one isolated episode during recent business trip. states does not use chronically and was one time occurrence with no further plans to usea gain.  to other providers however asked that this information not be disclosed to family.  - urine drug screen +cocaine, +marijuana  - avoid beta blockers for now given cocaine use  - patient asks that providers do NOT disclose this information to his family members patient admits to snorting cocaine one isolated episode during recent business trip. states does not use chronically and was one time occurrence with no further plans to use again.  to other providers however asked that this information not be disclosed to family.  - urine drug screen +cocaine, +marijuana  - avoid beta blockers for now given cocaine use  - patient asks that providers do NOT disclose this information to his family members patient admits to snorting cocaine one isolated episode during recent business trip. states does not use chronically and was one time occurrence with no further plans to use again.  - urine drug screen +cocaine, +marijuana  - avoid beta blockers for now given cocaine use  - patient asks that providers do NOT disclose this information to his family members

## 2022-04-10 NOTE — PROGRESS NOTE ADULT - PROBLEM SELECTOR PLAN 1
chest tightness with progression from with exertion to now at rest as well. improved after initiating DAPT + hep gtt  - EKG NSR with no ischemic changes  - trop 84-->87-->73, peaked at 87  - trend EKG q4h. Cardiology to be notified if any acute changes  - s/p ASA + plavix load at OSH  - c/w DAPT, hep gtt  - TTE with normal LVSF, EF 55-60%, no WMA, mild thickening+calcification of MV leaflets noted  - Cardiology consult appreciated  - plan for [facility] Mon 4/11  - hold off on beta blocker given recent cocaine use  - check HbA1c, lipid panel  - TSH wnl chest tightness with progression from with exertion to now at rest as well. improved after initiating DAPT + hep gtt  - EKG NSR with no ischemic changes  - trop 84-->87-->73, peaked at 87  - trend EKG q4h. Cardiology to be notified if any acute changes  - s/p ASA + plavix load at OSH  - c/w DAPT, hep gtt  - TTE with normal LVSF, EF 55-60%, no WMA, mild thickening+calcification of MV leaflets noted  - Cardiology consult appreciated  - plan for [facility] Mon 4/11  - hold off on beta blocker given recent cocaine use  - TSH wnl, HbA1c 6.2%, lipid panel noted

## 2022-04-10 NOTE — PROGRESS NOTE ADULT - PROBLEM SELECTOR PLAN 3
not on statin as has been intolerant of statins previously due to severe myalgias. insurance had denied repatha as outpatient. currently on zetia 10mg qHS only.  - resume zetia 10mg qHS as outpatient  - f/u lipid panel   - may need to re-trial on statin given above findings pending cath results not on statin as has been intolerant of statins previously due to severe myalgias. insurance had denied repatha as outpatient. currently on zetia 10mg qHS only.  - resume zetia 10mg qHS as outpatient  - lipid panel Tchol 177, triglycerides 352, HDL 33, LDL 73  - may need to re-trial on statin given above findings pending cath results

## 2022-04-10 NOTE — PROVIDER CONTACT NOTE (OTHER) - BACKGROUND
Patient admitted for NSTEMI PMH of HTN, CAD who presented with chest tightness on 4/9/22
Patient admitted on 4/9/22 Dx: NSTEMI.

## 2022-04-10 NOTE — PROGRESS NOTE ADULT - PROBLEM SELECTOR PLAN 4
on metformin 500mg daily at home.  - check HbA1c  - low dose sliding scale  - add basal/bolus if needed  - monitor FS qAC + qHS  - carb consistent diet on metformin 500mg daily at home.  - HbA1c 6.2%  - low dose sliding scale  - add basal/bolus if needed  - monitor FS qAC + qHS  - carb consistent diet

## 2022-04-10 NOTE — PROGRESS NOTE ADULT - NSPROGADDITIONALINFOA_GEN_ALL_CORE
.  Aylin Frank MD  Division of Hospital Medicine  [facility]   Available on Microsoft Teams - messages preferred prior to calls.    Plan discussed patient and medicine YAN Ge.

## 2022-04-11 LAB
ANION GAP SERPL CALC-SCNC: 13 MMOL/L — SIGNIFICANT CHANGE UP (ref 5–17)
APTT BLD: 61 SEC — HIGH (ref 27.5–35.5)
BUN SERPL-MCNC: 13 MG/DL — SIGNIFICANT CHANGE UP (ref 7–23)
CALCIUM SERPL-MCNC: 9.5 MG/DL — SIGNIFICANT CHANGE UP (ref 8.4–10.5)
CHLORIDE SERPL-SCNC: 104 MMOL/L — SIGNIFICANT CHANGE UP (ref 96–108)
CO2 SERPL-SCNC: 23 MMOL/L — SIGNIFICANT CHANGE UP (ref 22–31)
CREAT SERPL-MCNC: 1.02 MG/DL — SIGNIFICANT CHANGE UP (ref 0.5–1.3)
EGFR: 90 ML/MIN/1.73M2 — SIGNIFICANT CHANGE UP
GLUCOSE BLDC GLUCOMTR-MCNC: 130 MG/DL — HIGH (ref 70–99)
GLUCOSE BLDC GLUCOMTR-MCNC: 177 MG/DL — HIGH (ref 70–99)
GLUCOSE SERPL-MCNC: 138 MG/DL — HIGH (ref 70–99)
HCT VFR BLD CALC: 47.9 % — SIGNIFICANT CHANGE UP (ref 39–50)
HGB BLD-MCNC: 16.5 G/DL — SIGNIFICANT CHANGE UP (ref 13–17)
MCHC RBC-ENTMCNC: 28.4 PG — SIGNIFICANT CHANGE UP (ref 27–34)
MCHC RBC-ENTMCNC: 34.4 GM/DL — SIGNIFICANT CHANGE UP (ref 32–36)
MCV RBC AUTO: 82.3 FL — SIGNIFICANT CHANGE UP (ref 80–100)
NRBC # BLD: 0 /100 WBCS — SIGNIFICANT CHANGE UP (ref 0–0)
PLATELET # BLD AUTO: 292 K/UL — SIGNIFICANT CHANGE UP (ref 150–400)
POTASSIUM SERPL-MCNC: 4.1 MMOL/L — SIGNIFICANT CHANGE UP (ref 3.5–5.3)
POTASSIUM SERPL-SCNC: 4.1 MMOL/L — SIGNIFICANT CHANGE UP (ref 3.5–5.3)
RBC # BLD: 5.82 M/UL — HIGH (ref 4.2–5.8)
RBC # FLD: 12.8 % — SIGNIFICANT CHANGE UP (ref 10.3–14.5)
SODIUM SERPL-SCNC: 140 MMOL/L — SIGNIFICANT CHANGE UP (ref 135–145)
TROPONIN T, HIGH SENSITIVITY RESULT: 63 NG/L — HIGH (ref 0–51)
WBC # BLD: 9.1 K/UL — SIGNIFICANT CHANGE UP (ref 3.8–10.5)
WBC # FLD AUTO: 9.1 K/UL — SIGNIFICANT CHANGE UP (ref 3.8–10.5)

## 2022-04-11 PROCEDURE — 99233 SBSQ HOSP IP/OBS HIGH 50: CPT

## 2022-04-11 PROCEDURE — 93454 CORONARY ARTERY ANGIO S&I: CPT | Mod: 26,59

## 2022-04-11 PROCEDURE — 92928 PRQ TCAT PLMT NTRAC ST 1 LES: CPT | Mod: LC

## 2022-04-11 PROCEDURE — 93010 ELECTROCARDIOGRAM REPORT: CPT

## 2022-04-11 RX ORDER — SODIUM CHLORIDE 9 MG/ML
250 INJECTION INTRAMUSCULAR; INTRAVENOUS; SUBCUTANEOUS ONCE
Refills: 0 | Status: COMPLETED | OUTPATIENT
Start: 2022-04-11 | End: 2022-04-11

## 2022-04-11 RX ORDER — SODIUM CHLORIDE 9 MG/ML
1000 INJECTION INTRAMUSCULAR; INTRAVENOUS; SUBCUTANEOUS
Refills: 0 | Status: DISCONTINUED | OUTPATIENT
Start: 2022-04-11 | End: 2022-04-12

## 2022-04-11 RX ORDER — SODIUM CHLORIDE 9 MG/ML
500 INJECTION INTRAMUSCULAR; INTRAVENOUS; SUBCUTANEOUS
Refills: 0 | Status: DISCONTINUED | OUTPATIENT
Start: 2022-04-11 | End: 2022-04-12

## 2022-04-11 RX ADMIN — Medication 81 MILLIGRAM(S): at 08:54

## 2022-04-11 RX ADMIN — HEPARIN SODIUM 1300 UNIT(S)/HR: 5000 INJECTION INTRAVENOUS; SUBCUTANEOUS at 10:11

## 2022-04-11 RX ADMIN — CLOPIDOGREL BISULFATE 75 MILLIGRAM(S): 75 TABLET, FILM COATED ORAL at 08:54

## 2022-04-11 RX ADMIN — SODIUM CHLORIDE 75 MILLILITER(S): 9 INJECTION INTRAMUSCULAR; INTRAVENOUS; SUBCUTANEOUS at 17:19

## 2022-04-11 RX ADMIN — SODIUM CHLORIDE 750 MILLILITER(S): 9 INJECTION INTRAMUSCULAR; INTRAVENOUS; SUBCUTANEOUS at 12:54

## 2022-04-11 NOTE — PROGRESS NOTE ADULT - PROBLEM SELECTOR PLAN 2
patient admits to snorting cocaine one isolated episode during recent business trip. states does not use chronically and was one time occurrence with no further plans to use again.  - urine drug screen +cocaine, +marijuana  - avoid beta blockers for now given cocaine use  - patient asks that providers do NOT disclose this information to his family members

## 2022-04-11 NOTE — PROGRESS NOTE ADULT - SUBJECTIVE AND OBJECTIVE BOX
Patient seen and examined at bedside.    Overnight Events:   No events. Patient without symptoms. Plan for [facility] today.     Review Of Systems: No chest pain, shortness of breath, or palpitations            Current Meds:  acetaminophen     Tablet .. 650 milliGRAM(s) Oral every 6 hours PRN  aluminum hydroxide/magnesium hydroxide/simethicone Suspension 30 milliLiter(s) Oral every 4 hours PRN  aspirin enteric coated 81 milliGRAM(s) Oral daily  clopidogrel Tablet 75 milliGRAM(s) Oral daily  dextrose 5%. 1000 milliLiter(s) IV Continuous <Continuous>  dextrose 5%. 1000 milliLiter(s) IV Continuous <Continuous>  dextrose 50% Injectable 25 Gram(s) IV Push once  dextrose 50% Injectable 12.5 Gram(s) IV Push once  dextrose 50% Injectable 25 Gram(s) IV Push once  dextrose Oral Gel 15 Gram(s) Oral once PRN  glucagon  Injectable 1 milliGRAM(s) IntraMuscular once  heparin   Injectable 4000 Unit(s) IV Push every 6 hours PRN  heparin  Infusion.  Unit(s)/Hr IV Continuous <Continuous>  insulin lispro (ADMELOG) corrective regimen sliding scale   SubCutaneous three times a day before meals  insulin lispro (ADMELOG) corrective regimen sliding scale   SubCutaneous at bedtime  melatonin 3 milliGRAM(s) Oral at bedtime PRN  ondansetron Injectable 4 milliGRAM(s) IV Push every 8 hours PRN      Vitals:  T(F): 98 (04-11), Max: 98 (04-10)  HR: 83 (04-11) (75 - 83)  BP: 128/87 (04-11) (128/84 - 145/89)  RR: 18 (04-11)  SpO2: 93% (04-11)  I&O's Summary    10 Apr 2022 07:01  -  11 Apr 2022 07:00  --------------------------------------------------------  IN: 240 mL / OUT: 0 mL / NET: 240 mL    11 Apr 2022 07:01  -  11 Apr 2022 12:15  --------------------------------------------------------  IN: 240 mL / OUT: 650 mL / NET: -410 mL        Physical Exam:  Appearance: No acute distress; well appearing  Eyes: EOMI, no scleral icterus   HEENT: Normal oral mucosa  Cardiovascular: RRR, S1, S2, no murmurs, rubs, or gallops; no edema; no JVD  Respiratory: Clear to auscultation bilaterally, no auditory stridor   Gastrointestinal: soft, non-tender, non-distended with normal bowel sounds  Musculoskeletal: No clubbing; no joint deformity   Neurologic: Non-focal  Psychiatry: AAOx3, mood & affect appropriate  Skin: No rashes, ecchymoses, or cyanosis                          16.5   9.10  )-----------( 292      ( 11 Apr 2022 08:53 )             47.9     04-11    140  |  104  |  13  ----------------------------<  138<H>  4.1   |  23  |  1.02    Ca    9.5      11 Apr 2022 08:56  Phos  3.0     04-10  Mg     1.9     04-10    TPro  7.7  /  Alb  4.3  /  TBili  0.4  /  DBili  x   /  AST  22  /  ALT  40  /  AlkPhos  80  04-09    PT/INR - ( 09 Apr 2022 17:26 )   PT: 13.7 sec;   INR: 1.18 ratio         PTT - ( 11 Apr 2022 08:53 )  PTT:61.0 sec  CARDIAC MARKERS ( 11 Apr 2022 08:56 )  63 ng/L / x     / x     / x     / x     / x      CARDIAC MARKERS ( 10 Apr 2022 21:59 )  71 ng/L / x     / x     / x     / x     / x      CARDIAC MARKERS ( 10 Apr 2022 16:22 )  77 ng/L / x     / x     / x     / x     / x      CARDIAC MARKERS ( 10 Apr 2022 11:53 )  78 ng/L / x     / x     / x     / x     / x      CARDIAC MARKERS ( 10 Apr 2022 07:28 )  81 ng/L / x     / x     / x     / x     / x          Serum Pro-Brain Natriuretic Peptide: 94 pg/mL (04-09 @ 12:39)          New ECG(s): Personally reviewed      Interpretation of Telemetry: SR.

## 2022-04-11 NOTE — PATIENT PROFILE ADULT - FALL HARM RISK - UNIVERSAL INTERVENTIONS
Bed in lowest position, wheels locked, appropriate side rails in place/Call bell, personal items and telephone in reach/Instruct patient to call for assistance before getting out of bed or chair/Non-slip footwear when patient is out of bed/[place] to call system/Physically safe environment - no spills, clutter or unnecessary equipment/Purposeful Proactive Rounding/Room/bathroom lighting operational, light cord in reach

## 2022-04-11 NOTE — PROGRESS NOTE ADULT - PROBLEM SELECTOR PLAN 3
not on statin as has been intolerant of statins previously due to severe myalgias. insurance had denied repatha as outpatient. currently on zetia 10mg qHS only.  - resume zetia 10mg qHS as outpatient  - lipid panel Tchol 177, triglycerides 352, HDL 33, LDL 73  - may need to re-trial on statin given above findings pending cath results

## 2022-04-11 NOTE — PATIENT PROFILE ADULT - NSPRESCRUSEDDRG_GEN_A_NUR
Continue Dexamethasone q6h as ordered by primary team. Getting RT. Appreciate neurosurgery involvement. No

## 2022-04-11 NOTE — PROGRESS NOTE ADULT - PROBLEM SELECTOR PLAN 5
DVT ppx: on hep gtt    Dispo: pending cath today    discussed with wife bedside, ERNESTINE Cassidy and Dr Gomez

## 2022-04-11 NOTE — PROGRESS NOTE ADULT - PROBLEM SELECTOR PLAN 4
on metformin 500mg daily at home.  - HbA1c 6.2%  - low dose sliding scale  - monitor FS qAC + qHS  - carb consistent diet

## 2022-04-11 NOTE — CHART NOTE - NSCHARTNOTEFT_GEN_A_CORE
Patient is refusing EKG, troponin draws, and tele monitoring overnight. Will attempt to place leads again in AM. Patient is A&Ox4. Nurse educated on risks of refusing treatments and patient expressed understanding. VSS this evening; pending [facility] in AM. Will continue to monitor.     Debi HEREDIA  Medicine Patient is refusing EKG, troponin draws, and tele monitoring overnight. Will attempt to place telemetry leads again in AM. Patient is A&Ox4. Nurse educated on risks of refusing treatments and patient expressed understanding. VSS this evening; pending C in AM. Will continue to monitor.     Debi HEREDIA  Medicine

## 2022-04-11 NOTE — PATIENT PROFILE ADULT - FUNCTIONAL ASSESSMENT - BASIC MOBILITY 4.
4 = No assist / stand by assistance Surgeon:   Dalton   Assistant:   Desiree    Preoperative Diagnosis: Right  blind painful eye    Postoperative Diagnois: Same + perforated cornea with partial expulsion of contents    Procedure:   Enucleation of Right  eye with intraconal motility implant      Right temporary Torres style suture tarsorrhaphy        Anesthesia:    General  Complications:  None  Est. Blood Loss:  Negligible    FINDINGS:  Informed consent was obtained in the holding area and the Right  eye marked after confirmation with patient and re-examination as to appropriate surgical eye. The patient was taken to the operating room, induced into general anesthesia and the normal eye was noted and taped closed and covered with a protective Chery shield. The Right  eye was prepped and draped in the usual fashion for this type of procedure.  The anesthetic consisting of a 50:50 mixture of 2% Lidocaine with 1:100,000 epinephrine and 0.5% Bupivicaine with 1:200,000 epinephrine was injected retrobulbar fashion and then subconjunctivally for hydrodissection.   A peritomy for 360 degrees was performed and the 4 rectus muscles isolated with muscle hooks. The tendons were  from the globe with a hand-held thermal cautery. They were not dissected free of Tenon's tissue or capsule. The oblique tendon was cut and the inferior oblique was cauterized and cut. During the course of this procedure the partially exposed contents dislocated further with the intraocular lens and some vitreous presenting at the cornea. No contents were lost into the socket.  The nerve was isolated and clamped approximately 10mm behind the globe for 60 seconds. The nerve was cut anterior to this and the globe removed with a Ángela Clamp on the medial rectus tendon insertion. Pressure was placed in the orbit for hemostasis. Once achieved, the sizers were placed and the 22 mm implant determined to be the best fit for size and wound closure tension. Unfortunately once a 22 implant was  placed it was too large to close the without undue tension. A 20 mm implant was then placed in the wound was able to close nicely with no tension on the wound.  Tenon's and rectus muscles were closed anterior to the implant with a purse string 4-0 Chromic suture. The anterior Tenon's was closed with interrupted 5-0 vicryl sutures and the conjunctiva closed with a running 6-0 plain gut suture.  Foro double-armed Prolene suture both on chest through a bolster then into the lower lid below the lashes up through the tarsus and margin and then through the upper lid margin through the tarsus and out through the skin above the lashes and then through another bolster. This was tied for a temporary suture tarsorrhaphy to prevent extrusion of possible postoperative hemorrhage and swelling.  The patient was undraped, the speculum removed and a vented conformer placed between the lids. Erythromycin ophthalmic ointment was placed under the conformer and a large pressure patch placed over the socket.  The patient was undraped and the normal eye verified. The patient was awakened from anesthesia and taken to the recovery room.

## 2022-04-11 NOTE — PROGRESS NOTE ADULT - SUBJECTIVE AND OBJECTIVE BOX
Patient is a 52y old  Male who presents with a chief complaint of NSTEMI (11 Apr 2022 12:15)      SUBJECTIVE / OVERNIGHT EVENTS: No overnight events. Feels well, No complaints. Denies chest pain, sob, dizziness, palpitations, n/v.     Tele reviewed: sinus      ADDITIONAL REVIEW OF SYSTEMS: Negative except for above    MEDICATIONS  (STANDING):  aspirin enteric coated 81 milliGRAM(s) Oral daily  clopidogrel Tablet 75 milliGRAM(s) Oral daily  dextrose 5%. 1000 milliLiter(s) (50 mL/Hr) IV Continuous <Continuous>  dextrose 5%. 1000 milliLiter(s) (100 mL/Hr) IV Continuous <Continuous>  dextrose 50% Injectable 25 Gram(s) IV Push once  dextrose 50% Injectable 12.5 Gram(s) IV Push once  dextrose 50% Injectable 25 Gram(s) IV Push once  glucagon  Injectable 1 milliGRAM(s) IntraMuscular once  heparin  Infusion.  Unit(s)/Hr (10 mL/Hr) IV Continuous <Continuous>  insulin lispro (ADMELOG) corrective regimen sliding scale   SubCutaneous three times a day before meals  insulin lispro (ADMELOG) corrective regimen sliding scale   SubCutaneous at bedtime  sodium chloride 0.9%. 500 milliLiter(s) (75 mL/Hr) IV Continuous <Continuous>    MEDICATIONS  (PRN):  acetaminophen     Tablet .. 650 milliGRAM(s) Oral every 6 hours PRN Temp greater or equal to 38C (100.4F), Mild Pain (1 - 3)  aluminum hydroxide/magnesium hydroxide/simethicone Suspension 30 milliLiter(s) Oral every 4 hours PRN Dyspepsia  dextrose Oral Gel 15 Gram(s) Oral once PRN Blood Glucose LESS THAN 70 milliGRAM(s)/deciliter  heparin   Injectable 4000 Unit(s) IV Push every 6 hours PRN For aPTT less than 40  melatonin 3 milliGRAM(s) Oral at bedtime PRN Insomnia  ondansetron Injectable 4 milliGRAM(s) IV Push every 8 hours PRN Nausea and/or Vomiting      CAPILLARY BLOOD GLUCOSE      POCT Blood Glucose.: 130 mg/dL (11 Apr 2022 08:41)  POCT Blood Glucose.: 144 mg/dL (10 Apr 2022 21:27)  POCT Blood Glucose.: 129 mg/dL (10 Apr 2022 17:21)    I&O's Summary    10 Apr 2022 07:01  -  11 Apr 2022 07:00  --------------------------------------------------------  IN: 240 mL / OUT: 0 mL / NET: 240 mL    11 Apr 2022 07:01  -  11 Apr 2022 14:18  --------------------------------------------------------  IN: 240 mL / OUT: 650 mL / NET: -410 mL        PHYSICAL EXAM:  Vital Signs Last 24 Hrs  T(C): 36.8 (11 Apr 2022 12:54), Max: 36.8 (11 Apr 2022 12:54)  T(F): 98.2 (11 Apr 2022 12:54), Max: 98.2 (11 Apr 2022 12:54)  HR: 78 (11 Apr 2022 12:54) (78 - 83)  BP: 135/78 (11 Apr 2022 12:54) (128/84 - 145/89)  BP(mean): --  RR: 18 (11 Apr 2022 12:54) (16 - 18)  SpO2: 95% (11 Apr 2022 12:54) (93% - 95%)    PHYSICAL EXAM:  GENERAL: NAD, well-developed  HEAD:  Atraumatic, Normocephalic  EYES:  conjunctiva and sclera clear  NECK: Supple, No JVD  CHEST/LUNG: Clear to auscultation bilaterally; No wheeze  HEART: Regular rate and rhythm; No murmurs, rubs, or gallops  ABDOMEN: Soft, Nontender, Nondistended; Bowel sounds present  EXTREMITIES:  2+ Peripheral Pulses, No clubbing, cyanosis, or edema  PSYCH: AAOx3  NEUROLOGY: non-focal  SKIN: No rashes or lesions      LABS:                        16.5   9.10  )-----------( 292      ( 11 Apr 2022 08:53 )             47.9     04-11    140  |  104  |  13  ----------------------------<  138<H>  4.1   |  23  |  1.02    Ca    9.5      11 Apr 2022 08:56  Phos  3.0     04-10  Mg     1.9     04-10      PT/INR - ( 09 Apr 2022 17:26 )   PT: 13.7 sec;   INR: 1.18 ratio         PTT - ( 11 Apr 2022 08:53 )  PTT:61.0 sec            RADIOLOGY & ADDITIONAL TESTS:    Imaging Personally Reviewed:    Electrocardiogram Personally Reviewed:    COORDINATION OF CARE:  Care Discussed with Consultants/Other Providers [Y/N]:  Prior or Outpatient Records Reviewed [Y/N]:

## 2022-04-11 NOTE — PROGRESS NOTE ADULT - PROBLEM SELECTOR PLAN 1
chest pain resolved  - discussed with card DR Gomez: plan for cath today  - c/w heparin ggt,   - c/w asa daily  - c/w plavix daily  - EKG NSR with no ischemic changes  - trops peaked, no need to further trend  - TTE with normal LVSF, EF 55-60%, no WMA, mild thickening+calcification of MV leaflets noted  - hold off on beta blocker given recent cocaine use  - TSH wnl, HbA1c 6.2%, lipid panel noted

## 2022-04-12 ENCOUNTER — TRANSCRIPTION ENCOUNTER (OUTPATIENT)
Age: 52
End: 2022-04-12

## 2022-04-12 VITALS
SYSTOLIC BLOOD PRESSURE: 116 MMHG | RESPIRATION RATE: 18 BRPM | OXYGEN SATURATION: 93 % | HEART RATE: 79 BPM | TEMPERATURE: 98 F | DIASTOLIC BLOOD PRESSURE: 76 MMHG

## 2022-04-12 LAB
ANION GAP SERPL CALC-SCNC: 13 MMOL/L — SIGNIFICANT CHANGE UP (ref 5–17)
BUN SERPL-MCNC: 13 MG/DL — SIGNIFICANT CHANGE UP (ref 7–23)
CALCIUM SERPL-MCNC: 9 MG/DL — SIGNIFICANT CHANGE UP (ref 8.4–10.5)
CHLORIDE SERPL-SCNC: 104 MMOL/L — SIGNIFICANT CHANGE UP (ref 96–108)
CO2 SERPL-SCNC: 22 MMOL/L — SIGNIFICANT CHANGE UP (ref 22–31)
CREAT SERPL-MCNC: 1.1 MG/DL — SIGNIFICANT CHANGE UP (ref 0.5–1.3)
EGFR: 82 ML/MIN/1.73M2 — SIGNIFICANT CHANGE UP
GLUCOSE BLDC GLUCOMTR-MCNC: 130 MG/DL — HIGH (ref 70–99)
GLUCOSE SERPL-MCNC: 122 MG/DL — HIGH (ref 70–99)
HCT VFR BLD CALC: 46.1 % — SIGNIFICANT CHANGE UP (ref 39–50)
HGB BLD-MCNC: 15.9 G/DL — SIGNIFICANT CHANGE UP (ref 13–17)
MCHC RBC-ENTMCNC: 28.6 PG — SIGNIFICANT CHANGE UP (ref 27–34)
MCHC RBC-ENTMCNC: 34.5 GM/DL — SIGNIFICANT CHANGE UP (ref 32–36)
MCV RBC AUTO: 83.1 FL — SIGNIFICANT CHANGE UP (ref 80–100)
NRBC # BLD: 0 /100 WBCS — SIGNIFICANT CHANGE UP (ref 0–0)
PLATELET # BLD AUTO: 258 K/UL — SIGNIFICANT CHANGE UP (ref 150–400)
POTASSIUM SERPL-MCNC: 4.1 MMOL/L — SIGNIFICANT CHANGE UP (ref 3.5–5.3)
POTASSIUM SERPL-SCNC: 4.1 MMOL/L — SIGNIFICANT CHANGE UP (ref 3.5–5.3)
RBC # BLD: 5.55 M/UL — SIGNIFICANT CHANGE UP (ref 4.2–5.8)
RBC # FLD: 12.9 % — SIGNIFICANT CHANGE UP (ref 10.3–14.5)
SODIUM SERPL-SCNC: 139 MMOL/L — SIGNIFICANT CHANGE UP (ref 135–145)
WBC # BLD: 10.57 K/UL — HIGH (ref 3.8–10.5)
WBC # FLD AUTO: 10.57 K/UL — HIGH (ref 3.8–10.5)

## 2022-04-12 PROCEDURE — 84443 ASSAY THYROID STIM HORMONE: CPT

## 2022-04-12 PROCEDURE — C1725: CPT

## 2022-04-12 PROCEDURE — 80048 BASIC METABOLIC PNL TOTAL CA: CPT

## 2022-04-12 PROCEDURE — 96374 THER/PROPH/DIAG INJ IV PUSH: CPT

## 2022-04-12 PROCEDURE — C1887: CPT

## 2022-04-12 PROCEDURE — 99152 MOD SED SAME PHYS/QHP 5/>YRS: CPT

## 2022-04-12 PROCEDURE — C9600: CPT | Mod: LC

## 2022-04-12 PROCEDURE — 84100 ASSAY OF PHOSPHORUS: CPT

## 2022-04-12 PROCEDURE — 85027 COMPLETE CBC AUTOMATED: CPT

## 2022-04-12 PROCEDURE — C1769: CPT

## 2022-04-12 PROCEDURE — 84484 ASSAY OF TROPONIN QUANT: CPT

## 2022-04-12 PROCEDURE — 93454 CORONARY ARTERY ANGIO S&I: CPT | Mod: 59

## 2022-04-12 PROCEDURE — 80307 DRUG TEST PRSMV CHEM ANLYZR: CPT

## 2022-04-12 PROCEDURE — 83735 ASSAY OF MAGNESIUM: CPT

## 2022-04-12 PROCEDURE — 85610 PROTHROMBIN TIME: CPT

## 2022-04-12 PROCEDURE — 36415 COLL VENOUS BLD VENIPUNCTURE: CPT

## 2022-04-12 PROCEDURE — C1874: CPT

## 2022-04-12 PROCEDURE — 99232 SBSQ HOSP IP/OBS MODERATE 35: CPT

## 2022-04-12 PROCEDURE — C1894: CPT

## 2022-04-12 PROCEDURE — 99239 HOSP IP/OBS DSCHRG MGMT >30: CPT

## 2022-04-12 PROCEDURE — 93005 ELECTROCARDIOGRAM TRACING: CPT

## 2022-04-12 PROCEDURE — 83036 HEMOGLOBIN GLYCOSYLATED A1C: CPT

## 2022-04-12 PROCEDURE — 99285 EMERGENCY DEPT VISIT HI MDM: CPT | Mod: 25

## 2022-04-12 PROCEDURE — 85730 THROMBOPLASTIN TIME PARTIAL: CPT

## 2022-04-12 PROCEDURE — 86900 BLOOD TYPING SEROLOGIC ABO: CPT

## 2022-04-12 PROCEDURE — 99153 MOD SED SAME PHYS/QHP EA: CPT

## 2022-04-12 PROCEDURE — 86850 RBC ANTIBODY SCREEN: CPT

## 2022-04-12 PROCEDURE — 86901 BLOOD TYPING SEROLOGIC RH(D): CPT

## 2022-04-12 PROCEDURE — 82962 GLUCOSE BLOOD TEST: CPT

## 2022-04-12 PROCEDURE — 80061 LIPID PANEL: CPT

## 2022-04-12 RX ORDER — EZETIMIBE 10 MG/1
1 TABLET ORAL
Qty: 0 | Refills: 0 | DISCHARGE
Start: 2022-04-12

## 2022-04-12 RX ORDER — ASPIRIN/CALCIUM CARB/MAGNESIUM 324 MG
1 TABLET ORAL
Qty: 30 | Refills: 0
Start: 2022-04-12 | End: 2022-05-11

## 2022-04-12 RX ORDER — ATORVASTATIN CALCIUM 80 MG/1
1 TABLET, FILM COATED ORAL
Qty: 30 | Refills: 0
Start: 2022-04-12 | End: 2022-05-11

## 2022-04-12 RX ORDER — CLOPIDOGREL BISULFATE 75 MG/1
1 TABLET, FILM COATED ORAL
Qty: 30 | Refills: 0
Start: 2022-04-12 | End: 2022-05-11

## 2022-04-12 RX ORDER — PRAVASTATIN SODIUM 20 MG/1
20 TABLET ORAL DAILY
Qty: 90 | Refills: 3 | Status: COMPLETED | COMMUNITY
Start: 2017-04-04 | End: 2022-04-12

## 2022-04-12 RX ORDER — EZETIMIBE 10 MG/1
1 TABLET ORAL
Qty: 0 | Refills: 0 | DISCHARGE

## 2022-04-12 RX ORDER — ATORVASTATIN CALCIUM 80 MG/1
40 TABLET, FILM COATED ORAL AT BEDTIME
Refills: 0 | Status: DISCONTINUED | OUTPATIENT
Start: 2022-04-12 | End: 2022-04-12

## 2022-04-12 RX ORDER — NAPROXEN SODIUM 550 MG/1
550 TABLET ORAL
Qty: 60 | Refills: 3 | Status: COMPLETED | COMMUNITY
Start: 2020-02-11 | End: 2022-04-12

## 2022-04-12 RX ORDER — EZETIMIBE 10 MG/1
1 TABLET ORAL
Qty: 30 | Refills: 0
Start: 2022-04-12 | End: 2022-05-11

## 2022-04-12 RX ADMIN — CLOPIDOGREL BISULFATE 75 MILLIGRAM(S): 75 TABLET, FILM COATED ORAL at 08:24

## 2022-04-12 RX ADMIN — Medication 81 MILLIGRAM(S): at 08:24

## 2022-04-12 NOTE — PROGRESS NOTE ADULT - ASSESSMENT
52M with history of HLD, non obstructive CAD, T2DM presenting with worsening chest pain, initially with minimal exertion and now at rest. Of note he did take cocaine ~1 week ago which he reports he does not usually take (please do not share this information with family), and uses medicinal marijuana. Patient transferred from outside hospital for continual chest pain which was relieved after heparin and DAPT.     #NSTEMI - Trop plateau 70s. Currently on DAPT and heparin.     Recommendations:  -c/w DAPT, Heparin   -stop trending cardiac biomarkers   -[facility] today, will f/u results    
52M with history of HLD, non obstructive CAD, T2DM presenting with worsening chest pain, initially with minimal exertion and now at rest. Of note he did take cocaine ~1 week ago which he reports he does not usually take (please do not share this information with family), and uses medicinal marijuana. Patient transferred from outside hospital NSTEMI    #NSTEMI - Trop plateau 70s. Currently on DAPT and heparin. Now s/p [facility] on 4/11 with JUWAN to LCx (40% LAD lesion, closed nondominant RCA)    Recommendations:  -Continue Aspirin 81mg daily + Plavix 75mg daily at discharge  -Continue Atorvastatin 40mg QD at discharge  -Given patient's history of cocaine use, would hold off on beta-blocker initiation; if patient is able to stop use of cocaine for extended period of time, would then initiate beta-blocker on outpatient basis. 
53 yo male with PMH of HTN, HLD, nonobstructive CAD, T2DM who presents with chest tightness x 3-4 days that progress from with exertion to now at rest admitted for NSTEMI planned for cardiac cath on 4/11.

## 2022-04-12 NOTE — PROGRESS NOTE ADULT - SUBJECTIVE AND OBJECTIVE BOX
Patient seen and examined ....  Chief Complaint:     HPI:  53 yo male with PMH of HTN, HLD, nonobstructive CAD, T2DM who presents with chest tightness x 3-4 days. Patient states he was on a business trip in [place] last week with transient episode of substernal chest tightness with exertion that improved with rest. He began to notice during the middle of the week, he starting to experience more frequent episodes of substernal chest tightness, non-radiating, varying intensity 5-8/10 with minimal exertion. He states he had episodes akin to this previously and had cardiac work-up in the past with decision made for medical management. However, today he was picking up his son from hockey practice when the chest tightness again began with exertion, but did not resolve with rest and continued to worsen during the car ride home. When he arrived home, chest tightness peaked at 8/10 in severity with new dizziness and lightheaded prompting him to present to ED. No headache, blurred vision, diaphoresis, dyspnea, n/v, numbness, nor tingling.    In the ED, vitals stable. Afebrile, HR 70s, SBP 160s. Labs notable for trop 84. EKG with non-ischemic changes. Chest pain improved with initiation of DAPT and hep gtt thus transferred to [facility] ED for Cardiology eval for cardiac cath. Admitted to medicine for NSTEMI.     Of note, per Cardiology consult note:" patient reports that he did use cocaine at the work retreat (which he requests that we do not mention to his family), medicinal marijuana use, but no smoking. He had drinks at his work retreat but he usually does not drink".  Wife was present at time of my exam in the ED so he did not disclose this information to me. (2022 18:46)      PMH:   DM (diabetes mellitus)    High cholesterol    COVID-19 vaccine series completed      PSH:         Allergies:  No Known Allergies      Medications:   MEDICATIONS  (STANDING):  aspirin enteric coated 81 milliGRAM(s) Oral daily  atorvastatin 40 milliGRAM(s) Oral at bedtime  clopidogrel Tablet 75 milliGRAM(s) Oral daily  dextrose 5%. 1000 milliLiter(s) (50 mL/Hr) IV Continuous <Continuous>  dextrose 5%. 1000 milliLiter(s) (100 mL/Hr) IV Continuous <Continuous>  dextrose 50% Injectable 25 Gram(s) IV Push once  dextrose 50% Injectable 12.5 Gram(s) IV Push once  dextrose 50% Injectable 25 Gram(s) IV Push once  glucagon  Injectable 1 milliGRAM(s) IntraMuscular once  insulin lispro (ADMELOG) corrective regimen sliding scale   SubCutaneous three times a day before meals  insulin lispro (ADMELOG) corrective regimen sliding scale   SubCutaneous at bedtime  sodium chloride 0.9%. 500 milliLiter(s) (75 mL/Hr) IV Continuous <Continuous>  sodium chloride 0.9%. 1000 milliLiter(s) (75 mL/Hr) IV Continuous <Continuous>    MEDICATIONS  (PRN):  acetaminophen     Tablet .. 650 milliGRAM(s) Oral every 6 hours PRN Temp greater or equal to 38C (100.4F), Mild Pain (1 - 3)  aluminum hydroxide/magnesium hydroxide/simethicone Suspension 30 milliLiter(s) Oral every 4 hours PRN Dyspepsia  dextrose Oral Gel 15 Gram(s) Oral once PRN Blood Glucose LESS THAN 70 milliGRAM(s)/deciliter  melatonin 3 milliGRAM(s) Oral at bedtime PRN Insomnia  ondansetron Injectable 4 milliGRAM(s) IV Push every 8 hours PRN Nausea and/or Vomiting      Review of Systems all WNL except:  Constitutional: [ ] Fever [ ] Chills [ ] Fatigue [ ] Weight change   HEENT: [ ] Blurred vision [ ] Eye Pain [ ] Headache [ ] Runny nose [ ] Sore Throat   Respiratory: [ ] Cough [ ] Wheezing [ ] Shortness of breath  Cardiovascular: [ ] Chest Pain [ ] Palpitations [ ] OLEARY [ ] PND [ ] Orthopnea  Gastrointestinal: [ ] Abdominal Pain [ ] Diarrhea [ ] Constipation [ ] Hemorrhoids [ ] Nausea [ ] Vomiting  Genitourinary: [ ] Nocturia [ ] Dysuria [ ] Incontinence  Extremities: [ ] Swelling [ ] Joint Pain  Neurologic: [ ] Focal deficit [ ] Paresthesias [ ] Syncope  Lymphatic: [ ] Swelling [ ] Lymphadenopathy   Skin: [ ] Rash [ ] Ecchymoses [ ] Wounds [ ] Lesions  Psychiatry: [ ] Depression [ ] Suicidal/Homicidal Ideation [ ] Anxiety [ ] Sleep Disturbances  [ ] 10 point review of systems is otherwise negative except as mentioned above            [ ]Unable to obtain    T(C): 36.7 (22 @ 04:51), Max: 36.8 (22 @ 12:54)  HR: 90 (22 @ 04:51) (55 - 96)  BP: 111/74 (22 @ 04:51) (111/74 - 154/93)  RR: 18 (22 @ 04:51) (16 - 18)  SpO2: 93% (22 @ 04:51) (93% - 97%)  Wt(kg): --    04-10 @ 07:01  -   @ 07:00  --------------------------------------------------------  IN: 240 mL / OUT: 0 mL / NET: 240 mL     @ 07:01  -   @ 06:56  --------------------------------------------------------  IN: 440 mL / OUT: 1151 mL / NET: -711 mL      Daily     Daily     [place] Physical Exam:Procedural Access Site: [ ] hematoma [ ] tenderness to palpation [ ] 2+ pulse [ ] bruit [ ] Ecchymosis  Respiratory: [ ] Clear to auscultation bilaterally    Cardiovascular Diagnostic Testing:  Cardiovascular: [ ] S1 [ ] S2 [ ] RRR [ ] m/r/g [ ] edema [ ] JVP      ECG:    Echo:    Stress Testing:    Cardiac Cath Findings:    Interpretation of Telemetry:    Other Imaging:        Labs:                        16.5   9.10  )-----------( 292      ( 2022 08:53 )             47.9     04-11    140  |  104  |  13  ----------------------------<  138<H>  4.1   |  23  |  1.02    Ca    9.5      2022 08:56  Phos  3.0     04-10  Mg     1.9     04-10      PTT - ( 2022 08:53 )  PTT:61.0 sec        Total Cholesterol: 177  LDL: --  HDL: 33  T          HPI:  51 yo male with PMH of HTN, HLD, nonobstructive CAD, T2DM who presents with chest tightness x 3-4 days. Patient states he was on a business trip in [place] last week with transient episode of substernal chest tightness with exertion that improved with rest. He began to notice during the middle of the week, he starting to experience more frequent episodes of substernal chest tightness, non-radiating, varying intensity 5-8/10 with minimal exertion. He states he had episodes akin to this previously and had cardiac work-up in the past with decision made for medical management. However, today he was picking up his son from hockey practice when the chest tightness again began with exertion, but did not resolve with rest and continued to worsen during the car ride home. When he arrived home, chest tightness peaked at 8/10 in severity with new dizziness and lightheaded prompting him to present to ED. No headache, blurred vision, diaphoresis, dyspnea, n/v, numbness, nor tingling.    In the ED, vitals stable. Afebrile, HR 70s, SBP 160s. Labs notable for trop 84. EKG with non-ischemic changes. Chest pain improved with initiation of DAPT and hep gtt thus transferred to [facility] ED for Cardiology eval for cardiac cath. Admitted to medicine for NSTEMI.     Of note, per Cardiology consult note:" patient reports that he did use cocaine at the work retreat (which he requests that we do not mention to his family), medicinal marijuana use, but no smoking. He had drinks at his work retreat but he usually does not drink".  Wife was present at time of my exam in the ED so he did not disclose this information to me. (2022 18:46)      PMH:   DM (diabetes mellitus)    High cholesterol    COVID-19 vaccine series completed    PSH:     Allergies:  No Known Allergies      Medications:   MEDICATIONS  (STANDING):  aspirin enteric coated 81 milliGRAM(s) Oral daily  atorvastatin 40 milliGRAM(s) Oral at bedtime  clopidogrel Tablet 75 milliGRAM(s) Oral daily  dextrose 5%. 1000 milliLiter(s) (50 mL/Hr) IV Continuous <Continuous>  dextrose 5%. 1000 milliLiter(s) (100 mL/Hr) IV Continuous <Continuous>  dextrose 50% Injectable 25 Gram(s) IV Push once  dextrose 50% Injectable 12.5 Gram(s) IV Push once  dextrose 50% Injectable 25 Gram(s) IV Push once  glucagon  Injectable 1 milliGRAM(s) IntraMuscular once  insulin lispro (ADMELOG) corrective regimen sliding scale   SubCutaneous three times a day before meals  insulin lispro (ADMELOG) corrective regimen sliding scale   SubCutaneous at bedtime  sodium chloride 0.9%. 500 milliLiter(s) (75 mL/Hr) IV Continuous <Continuous>  sodium chloride 0.9%. 1000 milliLiter(s) (75 mL/Hr) IV Continuous <Continuous>    MEDICATIONS  (PRN):  acetaminophen     Tablet .. 650 milliGRAM(s) Oral every 6 hours PRN Temp greater or equal to 38C (100.4F), Mild Pain (1 - 3)  aluminum hydroxide/magnesium hydroxide/simethicone Suspension 30 milliLiter(s) Oral every 4 hours PRN Dyspepsia  dextrose Oral Gel 15 Gram(s) Oral once PRN Blood Glucose LESS THAN 70 milliGRAM(s)/deciliter  melatonin 3 milliGRAM(s) Oral at bedtime PRN Insomnia  ondansetron Injectable 4 milliGRAM(s) IV Push every 8 hours PRN Nausea and/or Vomiting      Review of Systems all WNL   T(C): 36.7 (22 @ 04:51), Max: 36.8 (22 @ 12:54)  HR: 90 (22 @ 04:51) (55 - 96)  BP: 111/74 (22 @ 04:51) (111/74 - 154/93)  RR: 18 (22 @ 04:51) (16 - 18)  SpO2: 93% (22 @ 04:51) (93% - 97%)  Wt(kg): --    04-10 @ 07:  -   @ 07:00  --------------------------------------------------------  IN: 240 mL / OUT: 0 mL / NET: 240 mL     @ 07:01  -  12 @ 06:56  --------------------------------------------------------  IN: 440 mL / OUT: 1151 mL / NET: -711 mL      Focused Physical Exam:  Procedural Access Site: Right radial site no hematoma, tenderness to palpation, bruit, ecchymosis, 2+ pulse  Respiratory: [x ] Clear to auscultation bilaterally    Cardiovascular Diagnostic Testing:  Cardiovascular: [ x] S1 [x ] S2 [x ] RRR. Nom/r/g, edema, JVP      ECG: NSR @ 80 bpm    Echo:  `< from: TTE Echo Complete w/o Contrast w/ Doppler (22 @ 14:51) >  Summary:   1. Technically difficult study with poor endocardial visualization.   2. Normal global left ventricular systolic function.   3. Left ventricular ejection fraction, by visual estimation, is 55 to  60%.   4. Mildly increased LV wall thickness.   5. Normal left ventricular internal cavity size.   6. The right ventricle is not well visualized, appears to have normal   systolic function.   7. The left atrium is normal in size.   8. The right atrium is normal in size.   9. Mild thickening and calcification of the anterior and posterior mitral valve leaflets.  10. Mild mitral valve regurgitation.  11. No aortic valve stenosis.  12. There is no evidence of pericardial effusion.    Cardiac Cath Findings:  `< from: Cardiac Catheterization (22 @ 16:21) >  Interventional Findings:   Interventional Details Distal circumflex: The initial stenosis was 99 %. Guidewire crossing was successful.  A successful Balloon angioplasty was performed using a 6FR JL 3.5 LAUNCHER, a STEWART OSUP022RN, and a SAPPHIRE 2.5 X 20.  The inflation pressure was 14 manny for the duration of 4.0 seconds.   A successful Drug Eluting Stent was deployed using a 3.00 X 38 SKYPOINT. The inflation pressure was 18 manny for the duration of 8.0 seconds.   A successful Balloon angioplasty was performed using a 3.25 X 27 EUPHORA NC. The inflation pressure was 20 manny for the duration of 16.0 seconds.  Following intervention there is a 1 % residual stenosis. There was PORSHA Flow 3 before the procedure and PORSHA Flow 3 following the procedure.      Interpretation of Telemetry: NSR @ 80-90 bpm     Labs:                        16.5   9.10  )-----------( 292      ( 2022 08:53 )             47.9     04-11    140  |  104  |  13  ----------------------------<  138<H>  4.1   |  23  |  1.02    Ca    9.5      2022 08:56  Phos  3.0     04-10  Mg     1.9     04-10      PTT - ( 2022 08:53 )  PTT:61.0 sec    Total Cholesterol: 177  LDL: --  HDL: 33  T    Assessment:  ·	NSTEMI  -S/P PCI of prox Cx x 1 JUWAN  -c/w ASA/Plavix,Statin  -Discharge planning as per Primary team.  -Follow Cardiologist as outpatient in 2 weeks.

## 2022-04-12 NOTE — DISCHARGE NOTE PROVIDER - NSDCMRMEDTOKEN_GEN_ALL_CORE_FT
aspirin 81 mg oral delayed release tablet: 1 tab(s) orally once a day MDD:1  clopidogrel 75 mg oral tablet: 1 tab(s) orally once a day MDD:1  ezetimibe 10 mg oral tablet: 1 tab(s) orally once a day  metFORMIN 500 mg oral tablet: 1 tab(s) orally once a day  naproxen 500 mg oral tablet: 1 tab(s) orally 2 times a day, As Needed   aspirin 81 mg oral delayed release tablet: 1 tab(s) orally once a day MDD:1  clopidogrel 75 mg oral tablet: 1 tab(s) orally once a day MDD:1  ezetimibe 10 mg oral tablet: 1 tab(s) orally once a day  metFORMIN 500 mg oral tablet: 1 tab(s) orally once a day

## 2022-04-12 NOTE — PROGRESS NOTE ADULT - SUBJECTIVE AND OBJECTIVE BOX
Patient seen and examined at bedside.    Overnight Events:     Review Of Systems: No chest pain, shortness of breath, or palpitations            Current Meds:  acetaminophen     Tablet .. 650 milliGRAM(s) Oral every 6 hours PRN  aluminum hydroxide/magnesium hydroxide/simethicone Suspension 30 milliLiter(s) Oral every 4 hours PRN  aspirin enteric coated 81 milliGRAM(s) Oral daily  atorvastatin 40 milliGRAM(s) Oral at bedtime  clopidogrel Tablet 75 milliGRAM(s) Oral daily  dextrose 5%. 1000 milliLiter(s) IV Continuous <Continuous>  dextrose 5%. 1000 milliLiter(s) IV Continuous <Continuous>  dextrose 50% Injectable 25 Gram(s) IV Push once  dextrose 50% Injectable 12.5 Gram(s) IV Push once  dextrose 50% Injectable 25 Gram(s) IV Push once  dextrose Oral Gel 15 Gram(s) Oral once PRN  glucagon  Injectable 1 milliGRAM(s) IntraMuscular once  insulin lispro (ADMELOG) corrective regimen sliding scale   SubCutaneous three times a day before meals  insulin lispro (ADMELOG) corrective regimen sliding scale   SubCutaneous at bedtime  melatonin 3 milliGRAM(s) Oral at bedtime PRN  ondansetron Injectable 4 milliGRAM(s) IV Push every 8 hours PRN  sodium chloride 0.9%. 500 milliLiter(s) IV Continuous <Continuous>  sodium chloride 0.9%. 1000 milliLiter(s) IV Continuous <Continuous>      Vitals:  T(F): 98 (04-12), Max: 98.2 (04-11)  HR: 90 (04-12) (55 - 96)  BP: 111/74 (04-12) (111/74 - 154/93)  RR: 18 (04-12)  SpO2: 93% (04-12)  I&O's Summary    11 Apr 2022 07:01  -  12 Apr 2022 07:00  --------------------------------------------------------  IN: 440 mL / OUT: 1151 mL / NET: -711 mL        Physical Exam:  Appearance: No acute distress; well appearing  Eyes: EOMI, no scleral icterus   HEENT: Normal oral mucosa  Cardiovascular: RRR, S1, S2, no murmurs, rubs, or gallops; no edema; no JVD  Respiratory: Clear to auscultation bilaterally, no auditory stridor   Gastrointestinal: soft, non-tender, non-distended with normal bowel sounds  Musculoskeletal: No clubbing; no joint deformity   Neurologic: Non-focal  Lymphatic: No lymphadenopathy  Psychiatry: AAOx3, mood & affect appropriate  Skin: No rashes, ecchymoses, or cyanosis                          15.9   10.57 )-----------( 258      ( 12 Apr 2022 07:11 )             46.1     04-12    139  |  104  |  13  ----------------------------<  122<H>  4.1   |  22  |  1.10    Ca    9.0      12 Apr 2022 07:09      PTT - ( 11 Apr 2022 08:53 )  PTT:61.0 sec  CARDIAC MARKERS ( 11 Apr 2022 08:56 )  63 ng/L / x     / x     / x     / x     / x      CARDIAC MARKERS ( 10 Apr 2022 21:59 )  71 ng/L / x     / x     / x     / x     / x      CARDIAC MARKERS ( 10 Apr 2022 16:22 )  77 ng/L / x     / x     / x     / x     / x      CARDIAC MARKERS ( 10 Apr 2022 11:53 )  78 ng/L / x     / x     / x     / x     / x      CARDIAC MARKERS ( 10 Apr 2022 07:28 )  81 ng/L / x     / x     / x     / x     / x          Serum Pro-Brain Natriuretic Peptide: 94 pg/mL (04-09 @ 12:39)          New ECG(s): Personally reviewed    Echo:    Stress Testing:     Cath:    Imaging:    Interpretation of Telemetry:   Patient seen and examined at bedside.    Overnight Events:   Cath yesterday with JUWAN placement. Patient without complaints this morning.     Review Of Systems: No chest pain, shortness of breath, or palpitations            Current Meds:  acetaminophen     Tablet .. 650 milliGRAM(s) Oral every 6 hours PRN  aluminum hydroxide/magnesium hydroxide/simethicone Suspension 30 milliLiter(s) Oral every 4 hours PRN  aspirin enteric coated 81 milliGRAM(s) Oral daily  atorvastatin 40 milliGRAM(s) Oral at bedtime  clopidogrel Tablet 75 milliGRAM(s) Oral daily  dextrose 5%. 1000 milliLiter(s) IV Continuous <Continuous>  dextrose 5%. 1000 milliLiter(s) IV Continuous <Continuous>  dextrose 50% Injectable 25 Gram(s) IV Push once  dextrose 50% Injectable 12.5 Gram(s) IV Push once  dextrose 50% Injectable 25 Gram(s) IV Push once  dextrose Oral Gel 15 Gram(s) Oral once PRN  glucagon  Injectable 1 milliGRAM(s) IntraMuscular once  insulin lispro (ADMELOG) corrective regimen sliding scale   SubCutaneous three times a day before meals  insulin lispro (ADMELOG) corrective regimen sliding scale   SubCutaneous at bedtime  melatonin 3 milliGRAM(s) Oral at bedtime PRN  ondansetron Injectable 4 milliGRAM(s) IV Push every 8 hours PRN  sodium chloride 0.9%. 500 milliLiter(s) IV Continuous <Continuous>  sodium chloride 0.9%. 1000 milliLiter(s) IV Continuous <Continuous>      Vitals:  T(F): 98 (04-12), Max: 98.2 (04-11)  HR: 90 (04-12) (55 - 96)  BP: 111/74 (04-12) (111/74 - 154/93)  RR: 18 (04-12)  SpO2: 93% (04-12)  I&O's Summary    11 Apr 2022 07:01  -  12 Apr 2022 07:00  --------------------------------------------------------  IN: 440 mL / OUT: 1151 mL / NET: -711 mL        Physical Exam:  Appearance: No acute distress; well appearing  Eyes: EOMI, no scleral icterus   HEENT: Normal oral mucosa  Cardiovascular: RRR, S1, S2, no murmurs, rubs, or gallops; no edema; no JVD  Respiratory: Clear to auscultation bilaterally, no auditory stridor   Gastrointestinal: soft, non-tender, non-distended with normal bowel sounds  Musculoskeletal: No clubbing; no joint deformity   Neurologic: Non-focal  Psychiatry: AAOx3, mood & affect appropriate  Skin: No rashes, ecchymoses, or cyanosis                          15.9   10.57 )-----------( 258      ( 12 Apr 2022 07:11 )             46.1     04-12    139  |  104  |  13  ----------------------------<  122<H>  4.1   |  22  |  1.10    Ca    9.0      12 Apr 2022 07:09      PTT - ( 11 Apr 2022 08:53 )  PTT:61.0 sec  CARDIAC MARKERS ( 11 Apr 2022 08:56 )  63 ng/L / x     / x     / x     / x     / x      CARDIAC MARKERS ( 10 Apr 2022 21:59 )  71 ng/L / x     / x     / x     / x     / x      CARDIAC MARKERS ( 10 Apr 2022 16:22 )  77 ng/L / x     / x     / x     / x     / x      CARDIAC MARKERS ( 10 Apr 2022 11:53 )  78 ng/L / x     / x     / x     / x     / x      CARDIAC MARKERS ( 10 Apr 2022 07:28 )  81 ng/L / x     / x     / x     / x     / x          Serum Pro-Brain Natriuretic Peptide: 94 pg/mL (04-09 @ 12:39)          New ECG(s): Personally reviewed    Echo:  Summary:   1. Technically difficult study with poor endocardial visualization.   2. Normal global left ventricular systolic function.   3. Left ventricular ejection fraction, by visual estimation, is 55 to   60%.   4. Mildly increased LV wall thickness.   5. Normal left ventricular internal cavity size.   6. The right ventricle is not well visualized, appears to have normal   systolic function.   7. The left atrium is normal in size.   8. The right atrium is normal in size.   9. Mild thickening and calcification of the anterior and posterior   mitral valve leaflets.  10. Mild mitral valve regurgitation.  11. No aortic valve stenosis.  12. There is no evidence of pericardial effusion.    Stress Testing:     Cath: 4/11/2022  JUWAN to LCx  Imaging:    Interpretation of Telemetry: SR 80s.

## 2022-04-12 NOTE — DISCHARGE NOTE NURSING/CASE MANAGEMENT/SOCIAL WORK - NSDCPEFALRISK_GEN_ALL_CORE
For information on Fall & Injury Prevention, visit: [email or web address] OR  [email or web address] OR  https://www.cdc.gov/steadi/patient.html

## 2022-04-12 NOTE — DISCHARGE NOTE PROVIDER - CARE PROVIDER_API CALL
Hiram Moya)  Cardiovascular Disease; Interventional Cardiology  [address] [place] [address]  Phone: [phone]  Fax: [phone]  Follow Up Time: 1 week    Reuben Duenas)  Medicine  [address], [facility] [address]  [place] [address]  Phone: [phone]  Fax: [phone]  Established Patient  Follow Up Time: 1 week

## 2022-04-12 NOTE — DISCHARGE NOTE NURSING/CASE MANAGEMENT/SOCIAL WORK - PATIENT PORTAL LINK FT
You can access the FollowMyHealth Patient Portal offered by [facility] by registering at the following website: [email or web address]. By joining [facility]’s FollowMyHealth portal, you will also be able to view your health information using other applications (apps) compatible with our system.

## 2022-04-12 NOTE — DISCHARGE NOTE PROVIDER - HOSPITAL COURSE
To be done. 51 yo male with PMH of HTN, HLD, nonobstructive CAD, T2DM who presents with chest tightness x 3-4 days that progress from with exertion to now at rest admitted for NSTEMI    Problem/Plan - 1:  ·  Problem: NSTEMI (non-ST elevation myocardial infarction).   ·  Plan: chest pain resolved  - s/p cath with PCI stent of subtotal distal LCX.  Moderate proximal LAD and   of small RCA  - c/w asa daily  - c/w plavix daily  - per cards stable for d/c  - EKG NSR with no ischemic changes  - trops peaked, no need to further trend  - TTE with normal LVSF, EF 55-60%, no WMA, mild thickening+calcification of MV leaflets noted  - hold off on beta blocker given recent cocaine use  - TSH wnl, HbA1c 6.2%, lipid panel noted.     Problem/Plan - 2:  ·  Problem: Cocaine use.   ·  Plan: patient admits to snorting cocaine one isolated episode during recent business trip. states does not use chronically and was one time occurrence with no further plans to use again.  - urine drug screen +cocaine, +marijuana  - avoid beta blockers for now given cocaine use  - patient asks that providers do NOT disclose this information to his family members.     Problem/Plan - 3:  ·  Problem: Hyperlipidemia.   ·  Plan: not on statin as has been intolerant of statins previously due to severe myalgias. insurance had denied repatha as outpatient. currently on zetia 10mg qHS only.  - resume zetia 10mg qHS as outpatient  - lipid panel Tchol 177, triglycerides 352, HDL 33, LDL 73  - may need to re-trial on statin as outpatient, refusing now.     Problem/Plan - 4:  ·  Problem: Type 2 diabetes mellitus.   ·  Plan: on metformin 500mg daily at home.  - HbA1c 6.2%  - low dose sliding scale  - monitor FS qAC + qHS  - carb consistent diet.     Problem/Plan - 5:  ·  Problem: Prophylactic measure.   ·  Plan: DVT ppx: ambulatory    Dispo: d/c home today with outpatient cards f/u Dr Moya 2 weeks, PCP 1 week    discussed with NP Ange  spent 40 min on d/c time.

## 2022-04-12 NOTE — DISCHARGE NOTE PROVIDER - NSDCCPCAREPLAN_GEN_ALL_CORE_FT
PRINCIPAL DISCHARGE DIAGNOSIS  Diagnosis: NSTEMI (non-ST elevation myocardial infarction)  Assessment and Plan of Treatment: Call your doctor if you have unusual chest pain, pressure, or discomfort, shortness of breath, nausea, vomiting, burping, heartburn, tingling upper body parts, sweating, cold, clammy sking, racing heartbeat  Call 911 if you think you are having a heart attack  Take all cardiac medications as prescribed - notify your doctor if you have any side effects  Follow cardiac diet - avoid fatty & fried foods, don't eat too much red meat, eat lots of fruits & vegetables, dairy products should be low fat  Lose weight if you are overweight  Become more active with walking, gardening, or any other activity that gets you to move

## 2022-04-12 NOTE — PROGRESS NOTE ADULT - PROBLEM SELECTOR PLAN 1
chest pain resolved  - s/p cath with PCI stent of subtotal distal LCX.  Moderate proximal LAD and   of small RCA  - c/w asa daily  - c/w plavix daily  - per cards stable for d/c  - EKG NSR with no ischemic changes  - trops peaked, no need to further trend  - TTE with normal LVSF, EF 55-60%, no WMA, mild thickening+calcification of MV leaflets noted  - hold off on beta blocker given recent cocaine use  - TSH wnl, HbA1c 6.2%, lipid panel noted

## 2022-04-12 NOTE — DISCHARGE NOTE PROVIDER - PROVIDER TOKENS
PROVIDER:[TOKEN:[[id number]],FOLLOWUP:[1 week]],PROVIDER:[TOKEN:[[id number]],FOLLOWUP:[1 week],ESTABLISHEDPATIENT:[T]]

## 2022-04-12 NOTE — PROGRESS NOTE ADULT - SUBJECTIVE AND OBJECTIVE BOX
Patient is a 52y old  Male who presents with a chief complaint of NSTEMI (12 Apr 2022 09:49)      SUBJECTIVE / OVERNIGHT EVENTS: No On events. Feels well, no complaints. Wants to go home, denies cp, sob, palpitations, dizziness, bleeding, arm pain.     Tele reviewed: sinus      ADDITIONAL REVIEW OF SYSTEMS: Negative except for above    MEDICATIONS  (STANDING):  aspirin enteric coated 81 milliGRAM(s) Oral daily  atorvastatin 40 milliGRAM(s) Oral at bedtime  clopidogrel Tablet 75 milliGRAM(s) Oral daily  dextrose 5%. 1000 milliLiter(s) (50 mL/Hr) IV Continuous <Continuous>  dextrose 5%. 1000 milliLiter(s) (100 mL/Hr) IV Continuous <Continuous>  dextrose 50% Injectable 25 Gram(s) IV Push once  dextrose 50% Injectable 12.5 Gram(s) IV Push once  dextrose 50% Injectable 25 Gram(s) IV Push once  glucagon  Injectable 1 milliGRAM(s) IntraMuscular once  insulin lispro (ADMELOG) corrective regimen sliding scale   SubCutaneous three times a day before meals  insulin lispro (ADMELOG) corrective regimen sliding scale   SubCutaneous at bedtime  sodium chloride 0.9%. 500 milliLiter(s) (75 mL/Hr) IV Continuous <Continuous>  sodium chloride 0.9%. 1000 milliLiter(s) (75 mL/Hr) IV Continuous <Continuous>    MEDICATIONS  (PRN):  acetaminophen     Tablet .. 650 milliGRAM(s) Oral every 6 hours PRN Temp greater or equal to 38C (100.4F), Mild Pain (1 - 3)  aluminum hydroxide/magnesium hydroxide/simethicone Suspension 30 milliLiter(s) Oral every 4 hours PRN Dyspepsia  dextrose Oral Gel 15 Gram(s) Oral once PRN Blood Glucose LESS THAN 70 milliGRAM(s)/deciliter  melatonin 3 milliGRAM(s) Oral at bedtime PRN Insomnia  ondansetron Injectable 4 milliGRAM(s) IV Push every 8 hours PRN Nausea and/or Vomiting      CAPILLARY BLOOD GLUCOSE      POCT Blood Glucose.: 130 mg/dL (12 Apr 2022 09:03)  POCT Blood Glucose.: 177 mg/dL (11 Apr 2022 21:44)    I&O's Summary    11 Apr 2022 07:01  -  12 Apr 2022 07:00  --------------------------------------------------------  IN: 440 mL / OUT: 1151 mL / NET: -711 mL    12 Apr 2022 07:01  -  12 Apr 2022 12:04  --------------------------------------------------------  IN: 240 mL / OUT: 0 mL / NET: 240 mL        PHYSICAL EXAM:  Vital Signs Last 24 Hrs  T(C): 36.7 (12 Apr 2022 04:51), Max: 36.8 (11 Apr 2022 12:54)  T(F): 98 (12 Apr 2022 04:51), Max: 98.2 (11 Apr 2022 12:54)  HR: 90 (12 Apr 2022 04:51) (55 - 96)  BP: 111/74 (12 Apr 2022 04:51) (111/74 - 154/93)  BP(mean): --  RR: 18 (12 Apr 2022 04:51) (16 - 18)  SpO2: 93% (12 Apr 2022 04:51) (93% - 97%)    PHYSICAL EXAM:  GENERAL: NAD, well-developed  HEAD:  Atraumatic, Normocephalic  EYES:  conjunctiva and sclera clear  NECK: Supple, No JVD  CHEST/LUNG: Clear to auscultation bilaterally; No wheeze  HEART: Regular rate and rhythm; No murmurs, rubs, or gallops  ABDOMEN: Soft, Nontender, Nondistended; Bowel sounds present  EXTREMITIES:  2+ Peripheral Pulses, No clubbing, cyanosis, or edema.  radial wrist wnl, 2+RP  PSYCH: AAOx3  NEUROLOGY: non-focal  SKIN: No rashes or lesions      LABS:                        15.9   10.57 )-----------( 258      ( 12 Apr 2022 07:11 )             46.1     04-12    139  |  104  |  13  ----------------------------<  122<H>  4.1   |  22  |  1.10    Ca    9.0      12 Apr 2022 07:09      PTT - ( 11 Apr 2022 08:53 )  PTT:61.0 sec            RADIOLOGY & ADDITIONAL TESTS:    Imaging Personally Reviewed:    Electrocardiogram Personally Reviewed:    COORDINATION OF CARE:  Care Discussed with Consultants/Other Providers [Y/N]:  Prior or Outpatient Records Reviewed [Y/N]:

## 2022-04-12 NOTE — DISCHARGE NOTE PROVIDER - CARE PROVIDERS DIRECT ADDRESSES
[email or web address]

## 2022-04-12 NOTE — PROGRESS NOTE ADULT - PROBLEM SELECTOR PLAN 5
DVT ppx: ambulatory    Dispo: d/c home today with outpatient cards f/u Dr Moya 2 weeks, PCP 1 week    discussed with ERNESTINE Cassidy DVT ppx: ambulatory    Dispo: d/c home today with outpatient cards f/u Dr Moya 2 weeks, PCP 1 week    discussed with NP Ange  spent 40 min on d/c time

## 2022-04-12 NOTE — PROGRESS NOTE ADULT - PROBLEM SELECTOR PLAN 3
not on statin as has been intolerant of statins previously due to severe myalgias. insurance had denied repatha as outpatient. currently on zetia 10mg qHS only.  - resume zetia 10mg qHS as outpatient  - lipid panel Tchol 177, triglycerides 352, HDL 33, LDL 73  - may need to re-trial on statin as outpatient, refusing now

## 2022-04-13 ENCOUNTER — NON-APPOINTMENT (OUTPATIENT)
Age: 52
End: 2022-04-13

## 2022-04-13 PROBLEM — Z92.29 PERSONAL HISTORY OF OTHER DRUG THERAPY: Chronic | Status: ACTIVE | Noted: 2022-04-09

## 2022-04-13 PROBLEM — E78.00 PURE HYPERCHOLESTEROLEMIA, UNSPECIFIED: Chronic | Status: ACTIVE | Noted: 2022-04-09

## 2022-04-13 PROBLEM — E11.9 TYPE 2 DIABETES MELLITUS WITHOUT COMPLICATIONS: Chronic | Status: ACTIVE | Noted: 2022-04-09

## 2022-04-19 ENCOUNTER — APPOINTMENT (OUTPATIENT)
Dept: FAMILY MEDICINE | Facility: CLINIC | Age: 52
End: 2022-04-19
Payer: COMMERCIAL

## 2022-04-19 VITALS
BODY MASS INDEX: 32.13 KG/M2 | DIASTOLIC BLOOD PRESSURE: 70 MMHG | SYSTOLIC BLOOD PRESSURE: 115 MMHG | HEIGHT: 68 IN | RESPIRATION RATE: 20 BRPM | WEIGHT: 212 LBS | HEART RATE: 68 BPM

## 2022-04-19 PROCEDURE — 36415 COLL VENOUS BLD VENIPUNCTURE: CPT

## 2022-04-19 PROCEDURE — 99496 TRANSJ CARE MGMT HIGH F2F 7D: CPT | Mod: 25

## 2022-04-19 NOTE — HISTORY OF PRESENT ILLNESS
[Post-hospitalization from ___ Hospital] : Post-hospitalization from [unfilled] Hospital [Admitted on: ___] : The patient was admitted on [unfilled] [Discharged on ___] : discharged on [unfilled] [Discharge Summary] : discharge summary [Pertinent Labs] : pertinent labs [Radiology Findings] : radiology findings [Discharge Med List] : discharge medication list [Other: ____] : [unfilled] [Med Reconciliation] : medication reconciliation has been completed [Patient Contacted By: ____] : and contacted by [unfilled] [FreeTextEntry2] : Presented to [place] ER with chest pain; troponins elevated; transferred to [facility] for dx of NSTEMI; had cath with stent placement.  Stabilized and discharged to outpatient Cardiology care.  Pt states feeling significantly improved at present.

## 2022-04-20 ENCOUNTER — NON-APPOINTMENT (OUTPATIENT)
Age: 52
End: 2022-04-20

## 2022-04-20 LAB
ALBUMIN SERPL ELPH-MCNC: 5.1 G/DL
ALP BLD-CCNC: 88 U/L
ALT SERPL-CCNC: 30 U/L
ANION GAP SERPL CALC-SCNC: 13 MMOL/L
AST SERPL-CCNC: 18 U/L
BASOPHILS # BLD AUTO: 0.06 K/UL
BASOPHILS NFR BLD AUTO: 0.8 %
BILIRUB SERPL-MCNC: 0.4 MG/DL
BUN SERPL-MCNC: 15 MG/DL
CALCIUM SERPL-MCNC: 9.9 MG/DL
CHLORIDE SERPL-SCNC: 104 MMOL/L
CHOLEST SERPL-MCNC: 156 MG/DL
CO2 SERPL-SCNC: 21 MMOL/L
CREAT SERPL-MCNC: 1.06 MG/DL
EGFR: 86 ML/MIN/1.73M2
EOSINOPHIL # BLD AUTO: 0.33 K/UL
EOSINOPHIL NFR BLD AUTO: 4.6 %
ESTIMATED AVERAGE GLUCOSE: 128 MG/DL
GLUCOSE SERPL-MCNC: 130 MG/DL
HBA1C MFR BLD HPLC: 6.1 %
HCT VFR BLD CALC: 48.3 %
HDLC SERPL-MCNC: 34 MG/DL
HGB BLD-MCNC: 16.6 G/DL
IMM GRANULOCYTES NFR BLD AUTO: 0.3 %
LDLC SERPL CALC-MCNC: 90 MG/DL
LYMPHOCYTES # BLD AUTO: 2.19 K/UL
LYMPHOCYTES NFR BLD AUTO: 30.5 %
MAN DIFF?: NORMAL
MCHC RBC-ENTMCNC: 28.5 PG
MCHC RBC-ENTMCNC: 34.4 GM/DL
MCV RBC AUTO: 82.8 FL
MONOCYTES # BLD AUTO: 0.47 K/UL
MONOCYTES NFR BLD AUTO: 6.5 %
NEUTROPHILS # BLD AUTO: 4.11 K/UL
NEUTROPHILS NFR BLD AUTO: 57.3 %
NONHDLC SERPL-MCNC: 121 MG/DL
PLATELET # BLD AUTO: 340 K/UL
POTASSIUM SERPL-SCNC: 4.5 MMOL/L
PROT SERPL-MCNC: 7.7 G/DL
RBC # BLD: 5.83 M/UL
RBC # FLD: 12.9 %
SODIUM SERPL-SCNC: 139 MMOL/L
TRIGL SERPL-MCNC: 156 MG/DL
WBC # FLD AUTO: 7.18 K/UL

## 2022-04-20 RX ORDER — PITAVASTATIN CALCIUM 1.04 MG/1
1 TABLET, FILM COATED ORAL
Qty: 90 | Refills: 2 | Status: DISCONTINUED | COMMUNITY
Start: 2021-11-23 | End: 2022-04-20

## 2022-04-20 RX ORDER — EVOLOCUMAB 140 MG/ML
140 INJECTION, SOLUTION SUBCUTANEOUS
Qty: 2 | Refills: 11 | Status: DISCONTINUED | COMMUNITY
Start: 2021-11-02 | End: 2022-04-20

## 2022-05-09 ENCOUNTER — APPOINTMENT (OUTPATIENT)
Dept: CARDIOLOGY | Facility: CLINIC | Age: 52
End: 2022-05-09
Payer: COMMERCIAL

## 2022-05-09 ENCOUNTER — NON-APPOINTMENT (OUTPATIENT)
Age: 52
End: 2022-05-09

## 2022-05-09 VITALS
BODY MASS INDEX: 32.13 KG/M2 | WEIGHT: 212 LBS | DIASTOLIC BLOOD PRESSURE: 82 MMHG | SYSTOLIC BLOOD PRESSURE: 123 MMHG | HEIGHT: 68 IN | OXYGEN SATURATION: 98 % | HEART RATE: 69 BPM

## 2022-05-09 PROCEDURE — 93000 ELECTROCARDIOGRAM COMPLETE: CPT

## 2022-05-09 PROCEDURE — 99215 OFFICE O/P EST HI 40 MIN: CPT

## 2022-05-15 ENCOUNTER — RESULT CHARGE (OUTPATIENT)
Age: 52
End: 2022-05-15

## 2022-05-19 NOTE — PROGRESS NOTE ADULT - SUBJECTIVE AND OBJECTIVE BOX
Aylin Frank MD  Division of Hospital Medicine  [facility]   Available on Microsoft Teams (Mon-Fri 8am-5pm)    * messages preferred prior to calls  Other Times:  [phone]      Patient is a 52y old  Male who presents with a chief complaint of NSTEMI (09 Apr 2022 18:46)      SUBJECTIVE / OVERNIGHT EVENTS: no acute events overnight. still with intermittent chest pressure but no active chest pain/tightness at time of my exam this morning. no fever, chills, dizziness, lightheadedness nor dyspnea. angry as informed cath postponed to tomorrow, but explained at length cath scheduling is not in my control and there are times unforeseen emergent cases do arise changing cath time scheduling.   ADDITIONAL REVIEW OF SYSTEMS:    MEDICATIONS  (STANDING):  aspirin enteric coated 81 milliGRAM(s) Oral daily  clopidogrel Tablet 75 milliGRAM(s) Oral daily  dextrose 5%. 1000 milliLiter(s) (100 mL/Hr) IV Continuous <Continuous>  dextrose 5%. 1000 milliLiter(s) (50 mL/Hr) IV Continuous <Continuous>  dextrose 50% Injectable 25 Gram(s) IV Push once  dextrose 50% Injectable 12.5 Gram(s) IV Push once  dextrose 50% Injectable 25 Gram(s) IV Push once  glucagon  Injectable 1 milliGRAM(s) IntraMuscular once  heparin  Infusion.  Unit(s)/Hr (10 mL/Hr) IV Continuous <Continuous>  insulin lispro (ADMELOG) corrective regimen sliding scale   SubCutaneous three times a day before meals  insulin lispro (ADMELOG) corrective regimen sliding scale   SubCutaneous at bedtime    MEDICATIONS  (PRN):  acetaminophen     Tablet .. 650 milliGRAM(s) Oral every 6 hours PRN Temp greater or equal to 38C (100.4F), Mild Pain (1 - 3)  aluminum hydroxide/magnesium hydroxide/simethicone Suspension 30 milliLiter(s) Oral every 4 hours PRN Dyspepsia  dextrose Oral Gel 15 Gram(s) Oral once PRN Blood Glucose LESS THAN 70 milliGRAM(s)/deciliter  heparin   Injectable 4000 Unit(s) IV Push every 6 hours PRN For aPTT less than 40  melatonin 3 milliGRAM(s) Oral at bedtime PRN Insomnia  ondansetron Injectable 4 milliGRAM(s) IV Push every 8 hours PRN Nausea and/or Vomiting      CAPILLARY BLOOD GLUCOSE      POCT Blood Glucose.: 105 mg/dL (10 Apr 2022 12:54)  POCT Blood Glucose.: 130 mg/dL (10 Apr 2022 09:01)  POCT Blood Glucose.: 131 mg/dL (09 Apr 2022 21:56)    I&O's Summary      PHYSICAL EXAM:  Vital Signs Last 24 Hrs  T(C): 36.8 (10 Apr 2022 11:51), Max: 37.1 (09 Apr 2022 15:55)  T(F): 98.2 (10 Apr 2022 11:51), Max: 98.7 (09 Apr 2022 15:55)  HR: 75 (10 Apr 2022 12:38) (69 - 82)  BP: 144/93 (10 Apr 2022 12:38) (106/69 - 164/85)  BP(mean): 95 (10 Apr 2022 11:51) (80 - 96)  RR: 18 (10 Apr 2022 12:38) (15 - 20)  SpO2: 97% (10 Apr 2022 12:38) (92% - 99%)    CONSTITUTIONAL: NAD, well-developed, well-groomed  EYES: PERRLA; conjunctiva and sclera clear  ENMT: Moist oral mucosa, no pharyngeal injection or exudates; normal dentition  NECK: Supple, no palpable masses; no thyromegaly  RESPIRATORY: Normal respiratory effort; lungs are clear to auscultation bilaterally  CARDIOVASCULAR: Regular rate and rhythm, normal S1 and S2, no murmur/rub/gallop; No lower extremity edema; Peripheral pulses are 2+ bilaterally  ABDOMEN: Soft, Non-distended,  Nontender to palpation, normoactive bowel sounds  MUSCULOSKELETAL:  No clubbing or cyanosis of digits; no joint swelling or tenderness to palpation  PSYCH: A+O to person, place, and time; affect appropriate  NEUROLOGY: CN 2-12 are intact and symmetric; no gross sensory deficits   SKIN: No rashes; no palpable lesions    LABS:                        16.1   9.12  )-----------( 299      ( 10 Apr 2022 07:08 )             46.8     04-10    139  |  103  |  15  ----------------------------<  140<H>  3.9   |  22  |  1.08    Ca    9.2      10 Apr 2022 07:28  Phos  3.0     04-10  Mg     1.9     04-10    TPro  7.7  /  Alb  4.3  /  TBili  0.4  /  DBili  x   /  AST  22  /  ALT  40  /  AlkPhos  80  04-09    PT/INR - ( 09 Apr 2022 17:26 )   PT: 13.7 sec;   INR: 1.18 ratio         PTT - ( 10 Apr 2022 07:48 )  PTT:33.5 sec          RADIOLOGY & ADDITIONAL TESTS:  Results Reviewed: no leukocytosis, H/H stable, trops downtrending  Imaging Personally Reviewed:  Electrocardiogram Personally Reviewed:    COORDINATION OF CARE:  Care Discussed with Consultants/Other Providers [Y]: medicine YAN Ge  Prior or Outpatient Records Reviewed [Y/N]:   From: Justine Harris  To: Zach Nye  Sent: 5/19/2022 12:25 PM CDT  Subject: New 90 day prescription request    Hi Dr Nye   My insurance is penalizing me for filling a 30 day refill. Could you please write/send a new 90 day prescription to the [facility] in [place] for Levoxyl? I’m leaving for vacation on Tuesday and need to have it before I leave. I might actually run out before then. Thanks so much.   Justine   Aylin Frank MD  Division of Hospital Medicine  [facility]   Available on Microsoft Teams (Mon-Fri 8am-5pm)    * messages preferred prior to calls  Other Times:  [phone]      Patient is a 52y old  Male who presents with a chief complaint of NSTEMI (09 Apr 2022 18:46)      SUBJECTIVE / OVERNIGHT EVENTS: no acute events overnight. still with intermittent chest pressure but no active chest pain/tightness at time of my exam this morning. no fever, chills, dizziness, lightheadedness nor dyspnea. angry as informed cath postponed to tomorrow, but explained at length cath scheduling is not in my control and there are times unforeseen emergent cases do arise changing cath time scheduling.   ADDITIONAL REVIEW OF SYSTEMS:    Tele: NSR HR 60s-100s, occ PVCs     MEDICATIONS  (STANDING):  aspirin enteric coated 81 milliGRAM(s) Oral daily  clopidogrel Tablet 75 milliGRAM(s) Oral daily  dextrose 5%. 1000 milliLiter(s) (100 mL/Hr) IV Continuous <Continuous>  dextrose 5%. 1000 milliLiter(s) (50 mL/Hr) IV Continuous <Continuous>  dextrose 50% Injectable 25 Gram(s) IV Push once  dextrose 50% Injectable 12.5 Gram(s) IV Push once  dextrose 50% Injectable 25 Gram(s) IV Push once  glucagon  Injectable 1 milliGRAM(s) IntraMuscular once  heparin  Infusion.  Unit(s)/Hr (10 mL/Hr) IV Continuous <Continuous>  insulin lispro (ADMELOG) corrective regimen sliding scale   SubCutaneous three times a day before meals  insulin lispro (ADMELOG) corrective regimen sliding scale   SubCutaneous at bedtime    MEDICATIONS  (PRN):  acetaminophen     Tablet .. 650 milliGRAM(s) Oral every 6 hours PRN Temp greater or equal to 38C (100.4F), Mild Pain (1 - 3)  aluminum hydroxide/magnesium hydroxide/simethicone Suspension 30 milliLiter(s) Oral every 4 hours PRN Dyspepsia  dextrose Oral Gel 15 Gram(s) Oral once PRN Blood Glucose LESS THAN 70 milliGRAM(s)/deciliter  heparin   Injectable 4000 Unit(s) IV Push every 6 hours PRN For aPTT less than 40  melatonin 3 milliGRAM(s) Oral at bedtime PRN Insomnia  ondansetron Injectable 4 milliGRAM(s) IV Push every 8 hours PRN Nausea and/or Vomiting      CAPILLARY BLOOD GLUCOSE      POCT Blood Glucose.: 105 mg/dL (10 Apr 2022 12:54)  POCT Blood Glucose.: 130 mg/dL (10 Apr 2022 09:01)  POCT Blood Glucose.: 131 mg/dL (09 Apr 2022 21:56)    I&O's Summary      PHYSICAL EXAM:  Vital Signs Last 24 Hrs  T(C): 36.8 (10 Apr 2022 11:51), Max: 37.1 (09 Apr 2022 15:55)  T(F): 98.2 (10 Apr 2022 11:51), Max: 98.7 (09 Apr 2022 15:55)  HR: 75 (10 Apr 2022 12:38) (69 - 82)  BP: 144/93 (10 Apr 2022 12:38) (106/69 - 164/85)  BP(mean): 95 (10 Apr 2022 11:51) (80 - 96)  RR: 18 (10 Apr 2022 12:38) (15 - 20)  SpO2: 97% (10 Apr 2022 12:38) (92% - 99%)    CONSTITUTIONAL: NAD, well-developed, well-groomed  EYES: PERRLA; conjunctiva and sclera clear  ENMT: Moist oral mucosa, no pharyngeal injection or exudates; normal dentition  NECK: Supple, no palpable masses; no thyromegaly  RESPIRATORY: Normal respiratory effort; lungs are clear to auscultation bilaterally  CARDIOVASCULAR: Regular rate and rhythm, normal S1 and S2, no murmur/rub/gallop; No lower extremity edema; Peripheral pulses are 2+ bilaterally  ABDOMEN: Soft, Non-distended,  Nontender to palpation, normoactive bowel sounds  MUSCULOSKELETAL:  No clubbing or cyanosis of digits; no joint swelling or tenderness to palpation  PSYCH: A+O to person, place, and time; affect appropriate  NEUROLOGY: CN 2-12 are intact and symmetric; no gross sensory deficits   SKIN: No rashes; no palpable lesions    LABS:                        16.1   9.12  )-----------( 299      ( 10 Apr 2022 07:08 )             46.8     04-10    139  |  103  |  15  ----------------------------<  140<H>  3.9   |  22  |  1.08    Ca    9.2      10 Apr 2022 07:28  Phos  3.0     04-10  Mg     1.9     04-10    TPro  7.7  /  Alb  4.3  /  TBili  0.4  /  DBili  x   /  AST  22  /  ALT  40  /  AlkPhos  80  04-09    PT/INR - ( 09 Apr 2022 17:26 )   PT: 13.7 sec;   INR: 1.18 ratio         PTT - ( 10 Apr 2022 07:48 )  PTT:33.5 sec          RADIOLOGY & ADDITIONAL TESTS:  Results Reviewed: no leukocytosis, H/H stable, trops downtrending  Imaging Personally Reviewed:  Electrocardiogram Personally Reviewed:    COORDINATION OF CARE:  Care Discussed with Consultants/Other Providers [Y]: medicine YAN Ge  Prior or Outpatient Records Reviewed [Y/N]:

## 2022-05-31 ENCOUNTER — OUTPATIENT (OUTPATIENT)
Dept: OUTPATIENT SERVICES | Facility: HOSPITAL | Age: 52
LOS: 1 days | End: 2022-05-31
Payer: COMMERCIAL

## 2022-05-31 ENCOUNTER — APPOINTMENT (OUTPATIENT)
Dept: CV DIAGNOSITCS | Facility: HOSPITAL | Age: 52
End: 2022-05-31

## 2022-05-31 ENCOUNTER — APPOINTMENT (OUTPATIENT)
Dept: CARDIOLOGY | Facility: CLINIC | Age: 52
End: 2022-05-31
Payer: COMMERCIAL

## 2022-05-31 DIAGNOSIS — I25.10 ATHEROSCLEROTIC HEART DISEASE OF NATIVE CORONARY ARTERY WITHOUT ANGINA PECTORIS: ICD-10-CM

## 2022-05-31 PROCEDURE — 93306 TTE W/DOPPLER COMPLETE: CPT

## 2022-05-31 PROCEDURE — 93306 TTE W/DOPPLER COMPLETE: CPT | Mod: 26

## 2022-06-03 ENCOUNTER — NON-APPOINTMENT (OUTPATIENT)
Age: 52
End: 2022-06-03

## 2022-06-03 ENCOUNTER — APPOINTMENT (OUTPATIENT)
Dept: CARDIOLOGY | Facility: CLINIC | Age: 52
End: 2022-06-03
Payer: COMMERCIAL

## 2022-06-03 VITALS
WEIGHT: 209 LBS | DIASTOLIC BLOOD PRESSURE: 80 MMHG | HEIGHT: 68 IN | OXYGEN SATURATION: 99 % | BODY MASS INDEX: 31.67 KG/M2 | SYSTOLIC BLOOD PRESSURE: 119 MMHG | HEART RATE: 74 BPM

## 2022-06-03 PROCEDURE — 99402 PREV MED CNSL INDIV APPRX 30: CPT | Mod: 25

## 2022-06-03 PROCEDURE — 93000 ELECTROCARDIOGRAM COMPLETE: CPT

## 2022-06-03 PROCEDURE — 99215 OFFICE O/P EST HI 40 MIN: CPT | Mod: 25

## 2022-06-06 RX ORDER — ALIROCUMAB 150 MG/ML
150 INJECTION, SOLUTION SUBCUTANEOUS
Qty: 1 | Refills: 6 | Status: DISCONTINUED | COMMUNITY
Start: 2022-06-03 | End: 2022-06-06

## 2022-06-12 NOTE — HISTORY OF PRESENT ILLNESS
[FreeTextEntry1] : 52 year old male with h/o HTN, HLD, CAD s/p LCX PCI with moderate LAD and RCA small .  Pt doing well since then without further Cp, SOB or h/A

## 2022-06-12 NOTE — DISCUSSION/SUMMARY
[FreeTextEntry1] : CAD- s/p LCX stents without sx\par \par HTN- controlled\par \par HLD- myalgias on statins.  Consult with Dr Lord\par \par Followup in 2 mths\par \par ECHO before next visit\par \par Time spent reviewing history, cath films, exam, EKG, pt discussion and note writing

## 2022-06-21 ENCOUNTER — RX RENEWAL (OUTPATIENT)
Age: 52
End: 2022-06-21

## 2022-07-19 ENCOUNTER — TRANSCRIPTION ENCOUNTER (OUTPATIENT)
Age: 52
End: 2022-07-19

## 2022-07-19 ENCOUNTER — APPOINTMENT (OUTPATIENT)
Dept: FAMILY MEDICINE | Facility: CLINIC | Age: 52
End: 2022-07-19

## 2022-07-19 VITALS
WEIGHT: 204 LBS | SYSTOLIC BLOOD PRESSURE: 110 MMHG | BODY MASS INDEX: 30.92 KG/M2 | HEART RATE: 77 BPM | HEIGHT: 68 IN | TEMPERATURE: 97.8 F | RESPIRATION RATE: 15 BRPM | OXYGEN SATURATION: 99 % | DIASTOLIC BLOOD PRESSURE: 76 MMHG

## 2022-07-19 DIAGNOSIS — R73.03 PREDIABETES.: ICD-10-CM

## 2022-07-19 PROCEDURE — 99214 OFFICE O/P EST MOD 30 MIN: CPT | Mod: 25

## 2022-07-19 PROCEDURE — 96372 THER/PROPH/DIAG INJ SC/IM: CPT

## 2022-07-19 RX ORDER — METHYLPRED ACET/NACL,ISO-OS/PF 40 MG/ML
40 VIAL (ML) INJECTION
Qty: 1 | Refills: 0 | Status: COMPLETED | OUTPATIENT
Start: 2022-07-19

## 2022-07-19 RX ADMIN — Medication 0 MG/ML: at 00:00

## 2022-07-19 NOTE — HISTORY OF PRESENT ILLNESS
[FreeTextEntry8] : Mr Jigar Matamoros is 53 yo male presents today for an acute gout flare up that occurred overnight, a few days ago was drinking with friends. Pt now experiencing gout flare. Pt advised will provide an injection of methylprednisolone followed by medrol pack to start tomorrow, and colchicine. Pt advised if symptoms fail to improve also get evaluation from podiatrist.

## 2022-07-19 NOTE — PHYSICAL EXAM
[No Acute Distress] : no acute distress [EOMI] : extraocular movements intact [Supple] : supple [Clear to Auscultation] : lungs were clear to auscultation bilaterally [Normal S1, S2] : normal S1 and S2 [Soft] : abdomen soft [Non Tender] : non-tender [No Rash] : no rash [Normal Affect] : the affect was normal [de-identified] : obese [de-identified] : gout flare, edema, erythema, noted left ankle [de-identified] : antalgic gait

## 2022-07-19 NOTE — REVIEW OF SYSTEMS
[Joint Pain] : joint pain [Negative] : Heme/Lymph [Chest Pain] : no chest pain [Palpitations] : no palpitations [Claudication] : no  leg claudication [Lower Ext Edema] : no lower extremity edema [Orthopena] : no orthopnea [FreeTextEntry9] : left ankle pain, swelling

## 2022-07-19 NOTE — ASSESSMENT
[FreeTextEntry1] : methylprednisolone 1 dose injection\par medrol pack from tomorrow\par add colchicine as well\par referral to podiatrist.

## 2022-08-15 ENCOUNTER — NON-APPOINTMENT (OUTPATIENT)
Age: 52
End: 2022-08-15

## 2022-09-02 ENCOUNTER — APPOINTMENT (OUTPATIENT)
Dept: CARDIOLOGY | Facility: CLINIC | Age: 52
End: 2022-09-02

## 2022-09-02 VITALS
OXYGEN SATURATION: 97 % | BODY MASS INDEX: 30.01 KG/M2 | DIASTOLIC BLOOD PRESSURE: 86 MMHG | HEIGHT: 68 IN | SYSTOLIC BLOOD PRESSURE: 125 MMHG | WEIGHT: 198 LBS | HEART RATE: 80 BPM

## 2022-09-02 DIAGNOSIS — I21.4 NON-ST ELEVATION (NSTEMI) MYOCARDIAL INFARCTION: ICD-10-CM

## 2022-09-02 PROCEDURE — 99214 OFFICE O/P EST MOD 30 MIN: CPT | Mod: 25

## 2022-09-02 PROCEDURE — 93000 ELECTROCARDIOGRAM COMPLETE: CPT

## 2022-09-02 RX ORDER — NAPROXEN SODIUM 500 MG/1
500 TABLET, FILM COATED, EXTENDED RELEASE ORAL DAILY
Refills: 0 | Status: DISCONTINUED | COMMUNITY
Start: 2022-05-09 | End: 2022-09-02

## 2022-09-02 RX ORDER — METFORMIN HYDROCHLORIDE 500 MG/1
500 TABLET, COATED ORAL
Qty: 90 | Refills: 3 | Status: DISCONTINUED | COMMUNITY
Start: 2021-11-02 | End: 2022-09-02

## 2022-09-02 RX ORDER — METHYLPREDNISOLONE 4 MG/1
4 TABLET ORAL
Qty: 1 | Refills: 0 | Status: DISCONTINUED | COMMUNITY
Start: 2022-07-19 | End: 2022-09-02

## 2022-09-07 LAB
ALBUMIN SERPL ELPH-MCNC: 4.6 G/DL
ALP BLD-CCNC: 95 U/L
ALT SERPL-CCNC: 16 U/L
ANION GAP SERPL CALC-SCNC: 11 MMOL/L
AST SERPL-CCNC: 15 U/L
BILIRUB SERPL-MCNC: 0.4 MG/DL
BUN SERPL-MCNC: 17 MG/DL
CALCIUM SERPL-MCNC: 9.8 MG/DL
CHLORIDE SERPL-SCNC: 107 MMOL/L
CHOLEST SERPL-MCNC: 113 MG/DL
CO2 SERPL-SCNC: 25 MMOL/L
CREAT SERPL-MCNC: 1.06 MG/DL
EGFR: 86 ML/MIN/1.73M2
ESTIMATED AVERAGE GLUCOSE: 117 MG/DL
GLUCOSE SERPL-MCNC: 108 MG/DL
HBA1C MFR BLD HPLC: 5.7 %
HDLC SERPL-MCNC: 39 MG/DL
LDLC SERPL CALC-MCNC: 52 MG/DL
NONHDLC SERPL-MCNC: 74 MG/DL
POTASSIUM SERPL-SCNC: 4.3 MMOL/L
PROT SERPL-MCNC: 7.3 G/DL
SODIUM SERPL-SCNC: 143 MMOL/L
TRIGL SERPL-MCNC: 108 MG/DL

## 2022-10-03 PROBLEM — I21.4 NSTEMI (NON-ST ELEVATED MYOCARDIAL INFARCTION): Status: ACTIVE | Noted: 2022-04-19

## 2022-10-03 NOTE — DISCUSSION/SUMMARY
[FreeTextEntry1] : CAD- s/p LCX stents without sx\par \par HTN- controlled\par \par HLD- myalgias on statins.  Consult with Dr Lord\par \par Followup in 3 mths\par \par Time spent reviewing history, cath films, exam, EKG, pt discussion and note writing [EKG obtained to assist in diagnosis and management of assessed problem(s)] : EKG obtained to assist in diagnosis and management of assessed problem(s)

## 2022-10-12 ENCOUNTER — RX RENEWAL (OUTPATIENT)
Age: 52
End: 2022-10-12

## 2022-10-31 ENCOUNTER — APPOINTMENT (OUTPATIENT)
Dept: CARDIOLOGY | Facility: CLINIC | Age: 52
End: 2022-10-31

## 2022-12-17 ENCOUNTER — RX RENEWAL (OUTPATIENT)
Age: 52
End: 2022-12-17

## 2023-01-11 ENCOUNTER — NON-APPOINTMENT (OUTPATIENT)
Age: 53
End: 2023-01-11

## 2023-01-11 ENCOUNTER — APPOINTMENT (OUTPATIENT)
Dept: CARDIOLOGY | Facility: CLINIC | Age: 53
End: 2023-01-11
Payer: COMMERCIAL

## 2023-01-11 VITALS
SYSTOLIC BLOOD PRESSURE: 121 MMHG | OXYGEN SATURATION: 99 % | HEART RATE: 72 BPM | BODY MASS INDEX: 31.32 KG/M2 | DIASTOLIC BLOOD PRESSURE: 85 MMHG | WEIGHT: 206 LBS

## 2023-01-11 PROCEDURE — 93000 ELECTROCARDIOGRAM COMPLETE: CPT

## 2023-01-11 PROCEDURE — 99215 OFFICE O/P EST HI 40 MIN: CPT | Mod: 25

## 2023-02-01 ENCOUNTER — OUTPATIENT (OUTPATIENT)
Dept: OUTPATIENT SERVICES | Facility: HOSPITAL | Age: 53
LOS: 1 days | End: 2023-02-01
Payer: COMMERCIAL

## 2023-02-01 ENCOUNTER — APPOINTMENT (OUTPATIENT)
Dept: CV DIAGNOSTICS | Facility: HOSPITAL | Age: 53
End: 2023-02-01

## 2023-02-01 DIAGNOSIS — I25.10 ATHEROSCLEROTIC HEART DISEASE OF NATIVE CORONARY ARTERY WITHOUT ANGINA PECTORIS: ICD-10-CM

## 2023-02-01 PROCEDURE — 78452 HT MUSCLE IMAGE SPECT MULT: CPT | Mod: 26,MH

## 2023-02-01 PROCEDURE — 93017 CV STRESS TEST TRACING ONLY: CPT

## 2023-02-01 PROCEDURE — 78452 HT MUSCLE IMAGE SPECT MULT: CPT | Mod: MH

## 2023-02-01 PROCEDURE — 93018 CV STRESS TEST I&R ONLY: CPT

## 2023-02-01 PROCEDURE — A9500: CPT

## 2023-02-01 PROCEDURE — 93016 CV STRESS TEST SUPVJ ONLY: CPT

## 2023-02-04 NOTE — DISCUSSION/SUMMARY
[FreeTextEntry1] : CAD- s/p LCX stents now with exertional sx.  Plan nuclear stress ASAP\par \par HTN- controlled\par \par HLD- myalgias on statins.  Consult with Dr Lord\par \par Followup in 3 weeks\par \par Time spent reviewing history, cath films, exam, EKG, pt discussion and note writing [EKG obtained to assist in diagnosis and management of assessed problem(s)] : EKG obtained to assist in diagnosis and management of assessed problem(s)

## 2023-02-04 NOTE — HISTORY OF PRESENT ILLNESS
[FreeTextEntry1] : 53 year old male with h/o HTN, HLD, CAD s/p LCX PCI with moderate LAD and RCA small .  Pt doing well until several weeks ago when he noted new onset chest pressure and SOB with exertion

## 2023-02-06 ENCOUNTER — APPOINTMENT (OUTPATIENT)
Dept: CARDIOLOGY | Facility: CLINIC | Age: 53
End: 2023-02-06
Payer: COMMERCIAL

## 2023-02-06 ENCOUNTER — NON-APPOINTMENT (OUTPATIENT)
Age: 53
End: 2023-02-06

## 2023-02-06 VITALS — SYSTOLIC BLOOD PRESSURE: 129 MMHG | HEART RATE: 82 BPM | DIASTOLIC BLOOD PRESSURE: 90 MMHG | OXYGEN SATURATION: 97 %

## 2023-02-06 DIAGNOSIS — Z11.52 ENCOUNTER FOR SCREENING FOR COVID-19: ICD-10-CM

## 2023-02-06 PROCEDURE — 99215 OFFICE O/P EST HI 40 MIN: CPT | Mod: 25

## 2023-02-06 PROCEDURE — 93000 ELECTROCARDIOGRAM COMPLETE: CPT

## 2023-02-14 LAB — SARS-COV-2 N GENE NPH QL NAA+PROBE: NOT DETECTED

## 2023-02-15 ENCOUNTER — OUTPATIENT (OUTPATIENT)
Dept: OUTPATIENT SERVICES | Facility: HOSPITAL | Age: 53
LOS: 1 days | End: 2023-02-15
Payer: COMMERCIAL

## 2023-02-15 ENCOUNTER — TRANSCRIPTION ENCOUNTER (OUTPATIENT)
Age: 53
End: 2023-02-15

## 2023-02-15 VITALS
HEIGHT: 68 IN | OXYGEN SATURATION: 99 % | RESPIRATION RATE: 16 BRPM | SYSTOLIC BLOOD PRESSURE: 114 MMHG | TEMPERATURE: 98 F | HEART RATE: 73 BPM | WEIGHT: 205.03 LBS | DIASTOLIC BLOOD PRESSURE: 74 MMHG

## 2023-02-15 VITALS
DIASTOLIC BLOOD PRESSURE: 74 MMHG | HEART RATE: 72 BPM | OXYGEN SATURATION: 96 % | SYSTOLIC BLOOD PRESSURE: 118 MMHG | RESPIRATION RATE: 16 BRPM

## 2023-02-15 DIAGNOSIS — Z95.5 PRESENCE OF CORONARY ANGIOPLASTY IMPLANT AND GRAFT: Chronic | ICD-10-CM

## 2023-02-15 DIAGNOSIS — R94.39 ABNORMAL RESULT OF OTHER CARDIOVASCULAR FUNCTION STUDY: ICD-10-CM

## 2023-02-15 LAB
ALBUMIN SERPL ELPH-MCNC: 4.5 G/DL — SIGNIFICANT CHANGE UP (ref 3.3–5)
ALP SERPL-CCNC: 88 U/L — SIGNIFICANT CHANGE UP (ref 40–120)
ALT FLD-CCNC: 25 U/L — SIGNIFICANT CHANGE UP (ref 10–45)
ANION GAP SERPL CALC-SCNC: 11 MMOL/L — SIGNIFICANT CHANGE UP (ref 5–17)
AST SERPL-CCNC: 19 U/L — SIGNIFICANT CHANGE UP (ref 10–40)
BILIRUB SERPL-MCNC: 0.3 MG/DL — SIGNIFICANT CHANGE UP (ref 0.2–1.2)
BUN SERPL-MCNC: 15 MG/DL — SIGNIFICANT CHANGE UP (ref 7–23)
CALCIUM SERPL-MCNC: 9.4 MG/DL — SIGNIFICANT CHANGE UP (ref 8.4–10.5)
CHLORIDE SERPL-SCNC: 103 MMOL/L — SIGNIFICANT CHANGE UP (ref 96–108)
CO2 SERPL-SCNC: 23 MMOL/L — SIGNIFICANT CHANGE UP (ref 22–31)
CREAT SERPL-MCNC: 1.05 MG/DL — SIGNIFICANT CHANGE UP (ref 0.5–1.3)
EGFR: 86 ML/MIN/1.73M2 — SIGNIFICANT CHANGE UP
GLUCOSE SERPL-MCNC: 133 MG/DL — HIGH (ref 70–99)
HCT VFR BLD CALC: 47.7 % — SIGNIFICANT CHANGE UP (ref 39–50)
HGB BLD-MCNC: 16.2 G/DL — SIGNIFICANT CHANGE UP (ref 13–17)
MCHC RBC-ENTMCNC: 28.4 PG — SIGNIFICANT CHANGE UP (ref 27–34)
MCHC RBC-ENTMCNC: 34 GM/DL — SIGNIFICANT CHANGE UP (ref 32–36)
MCV RBC AUTO: 83.5 FL — SIGNIFICANT CHANGE UP (ref 80–100)
NRBC # BLD: 0 /100 WBCS — SIGNIFICANT CHANGE UP (ref 0–0)
PLATELET # BLD AUTO: 284 K/UL — SIGNIFICANT CHANGE UP (ref 150–400)
POTASSIUM SERPL-MCNC: 4 MMOL/L — SIGNIFICANT CHANGE UP (ref 3.5–5.3)
POTASSIUM SERPL-SCNC: 4 MMOL/L — SIGNIFICANT CHANGE UP (ref 3.5–5.3)
PROT SERPL-MCNC: 7.2 G/DL — SIGNIFICANT CHANGE UP (ref 6–8.3)
RBC # BLD: 5.71 M/UL — SIGNIFICANT CHANGE UP (ref 4.2–5.8)
RBC # FLD: 12.6 % — SIGNIFICANT CHANGE UP (ref 10.3–14.5)
SODIUM SERPL-SCNC: 137 MMOL/L — SIGNIFICANT CHANGE UP (ref 135–145)
WBC # BLD: 9.78 K/UL — SIGNIFICANT CHANGE UP (ref 3.8–10.5)
WBC # FLD AUTO: 9.78 K/UL — SIGNIFICANT CHANGE UP (ref 3.8–10.5)

## 2023-02-15 PROCEDURE — 92928 PRQ TCAT PLMT NTRAC ST 1 LES: CPT | Mod: LD

## 2023-02-15 PROCEDURE — 93571 IV DOP VEL&/PRESS C FLO 1ST: CPT | Mod: LD

## 2023-02-15 PROCEDURE — 85027 COMPLETE CBC AUTOMATED: CPT

## 2023-02-15 PROCEDURE — C1769: CPT

## 2023-02-15 PROCEDURE — 93010 ELECTROCARDIOGRAM REPORT: CPT | Mod: 77

## 2023-02-15 PROCEDURE — C1887: CPT

## 2023-02-15 PROCEDURE — C9600: CPT | Mod: LD

## 2023-02-15 PROCEDURE — C1874: CPT

## 2023-02-15 PROCEDURE — 93458 L HRT ARTERY/VENTRICLE ANGIO: CPT | Mod: 59

## 2023-02-15 PROCEDURE — C1894: CPT

## 2023-02-15 PROCEDURE — 93458 L HRT ARTERY/VENTRICLE ANGIO: CPT | Mod: 26,59

## 2023-02-15 PROCEDURE — 93005 ELECTROCARDIOGRAM TRACING: CPT

## 2023-02-15 PROCEDURE — 99152 MOD SED SAME PHYS/QHP 5/>YRS: CPT

## 2023-02-15 PROCEDURE — 93010 ELECTROCARDIOGRAM REPORT: CPT

## 2023-02-15 PROCEDURE — 80053 COMPREHEN METABOLIC PANEL: CPT

## 2023-02-15 PROCEDURE — 93571 IV DOP VEL&/PRESS C FLO 1ST: CPT | Mod: 26,LD

## 2023-02-15 RX ORDER — SODIUM CHLORIDE 9 MG/ML
1000 INJECTION INTRAMUSCULAR; INTRAVENOUS; SUBCUTANEOUS
Refills: 0 | Status: DISCONTINUED | OUTPATIENT
Start: 2023-02-15 | End: 2023-03-01

## 2023-02-15 RX ORDER — EZETIMIBE 10 MG/1
1 TABLET ORAL
Qty: 0 | Refills: 0 | DISCHARGE

## 2023-02-15 RX ORDER — METFORMIN HYDROCHLORIDE 850 MG/1
1 TABLET ORAL
Qty: 0 | Refills: 0 | DISCHARGE

## 2023-02-15 RX ORDER — SEMAGLUTIDE 0.68 MG/ML
1 INJECTION, SOLUTION SUBCUTANEOUS
Qty: 0 | Refills: 0 | DISCHARGE

## 2023-02-15 RX ORDER — EVOLOCUMAB 140 MG/ML
140 INJECTION, SOLUTION SUBCUTANEOUS
Qty: 0 | Refills: 0 | DISCHARGE

## 2023-02-15 RX ORDER — SODIUM CHLORIDE 9 MG/ML
250 INJECTION INTRAMUSCULAR; INTRAVENOUS; SUBCUTANEOUS ONCE
Refills: 0 | Status: COMPLETED | OUTPATIENT
Start: 2023-02-15 | End: 2023-02-15

## 2023-02-15 RX ORDER — CLOPIDOGREL BISULFATE 75 MG/1
1 TABLET, FILM COATED ORAL
Qty: 90 | Refills: 3
Start: 2023-02-15 | End: 2024-02-09

## 2023-02-15 RX ADMIN — SODIUM CHLORIDE 750 MILLILITER(S): 9 INJECTION INTRAMUSCULAR; INTRAVENOUS; SUBCUTANEOUS at 08:30

## 2023-02-15 RX ADMIN — SODIUM CHLORIDE 180 MILLILITER(S): 9 INJECTION INTRAMUSCULAR; INTRAVENOUS; SUBCUTANEOUS at 10:50

## 2023-02-15 RX ADMIN — SODIUM CHLORIDE 75 MILLILITER(S): 9 INJECTION INTRAMUSCULAR; INTRAVENOUS; SUBCUTANEOUS at 09:00

## 2023-02-15 NOTE — ASU PATIENT PROFILE, ADULT - FALL HARM RISK - UNIVERSAL INTERVENTIONS
Bed in lowest position, wheels locked, appropriate side rails in place/Call bell, personal items and telephone in reach/Instruct patient to call for assistance before getting out of bed or chair/Non-slip footwear when patient is out of bed/[place] to call system/Physically safe environment - no spills, clutter or unnecessary equipment/Purposeful Proactive Rounding/Room/bathroom lighting operational, light cord in reach

## 2023-02-15 NOTE — ASU DISCHARGE PLAN (ADULT/PEDIATRIC) - CARE PROVIDER_API CALL
Hiram Moya)  Cardiovascular Disease; Interventional Cardiology  [address] [place] [address]  Phone: [phone]  Fax: [phone]  Follow Up Time: 2 weeks

## 2023-02-15 NOTE — ASU DISCHARGE PLAN (ADULT/PEDIATRIC) - NS MD DC FALL RISK RISK
For information on Fall & Injury Prevention, visit: [email or web address] OR  [email or web address] OR  https://www.cdc.gov/steadi/patient.html

## 2023-02-15 NOTE — H&P CARDIOLOGY - HISTORY OF PRESENT ILLNESS
53 year old  male, no implantable devices with HTN, HLD, NSTEMI and CAD s/p 4/112022  PCI of LCX with mod LAD dz and small RCA  who c/o chest pressure and sob with exertion. 2/1/23 nuclear stress test revealed medium: mild to moderate defect in the mid to basal anteroseptal wall, partially reversible c/w infarct with edda infarct ischemia. Large: moderate to severe defect in the mid to basal inferior/inferoseptal wall: fixed c/w infarct, EF 55%. Pt presents today for left heart cath.   53 year old  male, no implantable devices with HTN, HLD, pre diabetes,  NSTEMI and CAD s/p 4/112022  PCI of LCX with mod LAD dz and small RCA  who c/o chest pressure and sob with exertion. 2/1/23 nuclear stress test revealed medium: mild to moderate defect in the mid to basal anteroseptal wall, partially reversible c/w infarct with edda infarct ischemia. Large: moderate to severe defect in the mid to basal inferior/inferoseptal wall: fixed c/w infarct, EF 55%. Pt presents today for left heart cath.

## 2023-02-15 NOTE — ASU PREOP CHECKLIST - SELECT TESTS ORDERED
9 weeks pregnant-- calling because she isnt able to keep much down the last couple days    Results in MD note

## 2023-02-15 NOTE — ASU DISCHARGE PLAN (ADULT/PEDIATRIC) - ASU DC SPECIAL INSTRUCTIONSFT
Wound Care: The day AFTER your procedure:  Remove bandage GENTLY, and clean using mild soap and gentle warm, water stream, pat dry.   Leave OPEN to air. YOU MAY SHOWER  DO NOT SOAK your procedure site for 1 week (no baths, no pools, no tubs)  Check your wrist or groin puncture site everyday.  A small amount of soreness and bruising is normal  ACTIVITY for the next 24 hours:  1) DO NOT DRIVE  2) DO NOT make any important decisions or sign legal documents  3) DO NOT operate heavy [employer]   You may resume sexual activity in 48 hours, unless otherwise instructed by your cardiologist  If your procedure was done through the WRIST, for the NEXT 3 DAYS:  AVOID pushing pulling  or repeated movement of that hand and wrist (eg: typing, hammering)  DO NOT LIFT anything more than 5 lbs     If your procedure was done through the GROIN, for the NEXT 5 DAYS:  Limit climbing the stairs, no strenuous activities, no pushing, no pulling, no straining  Do not lift anything that is 10lbs or heavier   MEDICATION:  Take your medications as explained (see discharge paperwork). If you received a stent, you will be taking medication to KEEP YOUR STENT OPEN. DO NOT STOP THESE MEDICATIONS UNLESS DIRECTED BY A CARDIOLOGIST  If you smoke, WE RECOMMEND YOU QUIT (you may call [phone] the Center of Tobacco Control if you need assistance)   FOLLOW UP: Please follow up with your private cardiologist (insert name) in 2 weeks.  Please call immediately for an appointment upon discharge from the hospital.    ***CALL YOUR DOCTOR***   If you experience: chest pain, fever, chills, body aches, or severe pain, swelling, redness, heat or yellow discharge at incision site or if you experience bleeding, temperature change, numbness or excruciating pain at the procedural site  If you are unable to reach your doctor, you may contact:    Cardiology Office at [facility] at [phone]   Cardiac Short Stay Unit (CSSU) [phone]    Cardiac Recovery Suite (CRS) [phone]

## 2023-02-15 NOTE — H&P CARDIOLOGY - NSICDXPASTMEDICALHX_GEN_ALL_CORE_FT
PAST MEDICAL HISTORY:  CAD in native artery     COVID-19 vaccine series completed     DM (diabetes mellitus)     High cholesterol      PAST MEDICAL HISTORY:  CAD in native artery     COVID-19 vaccine series completed     DM (diabetes mellitus)     High cholesterol     NSTEMI (non-ST elevated myocardial infarction)

## 2023-02-27 ENCOUNTER — RX RENEWAL (OUTPATIENT)
Age: 53
End: 2023-02-27

## 2023-03-06 ENCOUNTER — TRANSCRIPTION ENCOUNTER (OUTPATIENT)
Age: 53
End: 2023-03-06

## 2023-03-12 NOTE — DISCUSSION/SUMMARY
[EKG obtained to assist in diagnosis and management of assessed problem(s)] : EKG obtained to assist in diagnosis and management of assessed problem(s) [FreeTextEntry1] : CAD- s/p LCX stents now with exertional sx. Stress psotive.  Plan cath ASAP\par \par HTN- controlled\par \par HLD- myalgias on statins.  Consult with Dr Lord\par \par Followup in 3 weeks\par \par Time spent reviewing history, cath films, exam, EKG, pt discussion and note writing

## 2023-03-12 NOTE — HISTORY OF PRESENT ILLNESS
[FreeTextEntry1] : 53 year old male with h/o HTN, HLD, CAD s/p LCX PCI with moderate LAD and RCA small .  Pt doing well until several weeks ago when he noted new onset chest pressure and SOB with exertion.  Stress was positive and sx continue

## 2023-03-15 ENCOUNTER — APPOINTMENT (OUTPATIENT)
Dept: CARDIOLOGY | Facility: HOSPITAL | Age: 53
End: 2023-03-15
Payer: COMMERCIAL

## 2023-03-15 ENCOUNTER — NON-APPOINTMENT (OUTPATIENT)
Age: 53
End: 2023-03-15

## 2023-03-15 VITALS — DIASTOLIC BLOOD PRESSURE: 75 MMHG | OXYGEN SATURATION: 97 % | HEART RATE: 63 BPM | SYSTOLIC BLOOD PRESSURE: 115 MMHG

## 2023-03-15 PROBLEM — I25.10 ATHEROSCLEROTIC HEART DISEASE OF NATIVE CORONARY ARTERY WITHOUT ANGINA PECTORIS: Chronic | Status: ACTIVE | Noted: 2023-02-15

## 2023-03-15 PROBLEM — I21.4 NON-ST ELEVATION (NSTEMI) MYOCARDIAL INFARCTION: Chronic | Status: ACTIVE | Noted: 2023-02-15

## 2023-03-15 PROCEDURE — 99215 OFFICE O/P EST HI 40 MIN: CPT | Mod: 25

## 2023-03-15 PROCEDURE — 93000 ELECTROCARDIOGRAM COMPLETE: CPT

## 2023-03-20 ENCOUNTER — APPOINTMENT (OUTPATIENT)
Dept: FAMILY MEDICINE | Facility: CLINIC | Age: 53
End: 2023-03-20
Payer: COMMERCIAL

## 2023-03-20 VITALS
BODY MASS INDEX: 32.13 KG/M2 | WEIGHT: 212 LBS | HEIGHT: 68 IN | HEART RATE: 68 BPM | DIASTOLIC BLOOD PRESSURE: 75 MMHG | SYSTOLIC BLOOD PRESSURE: 120 MMHG | RESPIRATION RATE: 20 BRPM

## 2023-03-20 DIAGNOSIS — Z23 ENCOUNTER FOR IMMUNIZATION: ICD-10-CM

## 2023-03-20 PROCEDURE — 90686 IIV4 VACC NO PRSV 0.5 ML IM: CPT

## 2023-03-20 PROCEDURE — 36415 COLL VENOUS BLD VENIPUNCTURE: CPT

## 2023-03-20 PROCEDURE — G0008: CPT

## 2023-03-20 PROCEDURE — 99396 PREV VISIT EST AGE 40-64: CPT | Mod: 25

## 2023-03-20 NOTE — COUNSELING
[Benefits of weight loss discussed] : Benefits of weight loss discussed [Encouraged to increase physical activity] : Encouraged to increase physical activity [de-identified] : Healthy eating and activities [None] : None [Good understanding] : Patient has a good understanding of lifestyle changes and steps needed to achieve self management goal

## 2023-03-20 NOTE — HEALTH RISK ASSESSMENT
[Yes] : Yes [Monthly or less (1 pt)] : Monthly or less (1 point) [1 or 2 (0 pts)] : 1 or 2 (0 points) [Never (0 pts)] : Never (0 points) [No] : In the past 12 months have you used drugs other than those required for medical reasons? No [0] : 2) Feeling down, depressed, or hopeless: Not at all (0) [Patient declined colonoscopy] : Patient declined colonoscopy [With Patient/Caregiver] : , with patient/caregiver [Reviewed no changes] : Reviewed, no changes [Former] : Former [20 or more] : 20 or more [< 15 Years] : < 15 Years [Audit-CScore] : 1 [[id number]] : 0 [ColonoscopyComments] : per Cardiology advisement  [AdvancecareDate] : 03/23

## 2023-03-20 NOTE — HISTORY OF PRESENT ILLNESS
[FreeTextEntry1] : Requests comprehensive exam [de-identified] : Presents for comprehensive exam.  States feeling generally well; disappointed with weight gain - states will be imroving diet and increasing exercise with goal of wt loss.  Reviewed screening and immunizations - has not received the current flu vaccine but does request it at this encounter; also plans on going for a colonoscopy but has declined at present on the advice of Cardiology until off DAPT.\par Does complain of some perianal discomfort; also states "hands and feet get cold."  Denies color changes. \par Also reviewed smoking history - pt states had been an approximately 30 pack year smoker; quit about 4 years ago; feel lung cancer screening is therefore appropriate - request placed and staff to assist.

## 2023-03-20 NOTE — ASSESSMENT
[FreeTextEntry1] : Hemodynamically stable with acceptable BP\par Perianal excoriations - recommend OTC "Valentino-Aid"\par Lab profiles drawn in office and sent; will include inflammatory markers due to C/O "cold hands and feet"\par Flu vaccine given L deltoid

## 2023-03-21 LAB
ALBUMIN SERPL ELPH-MCNC: 4.9 G/DL
ALP BLD-CCNC: 89 U/L
ALT SERPL-CCNC: 20 U/L
ANION GAP SERPL CALC-SCNC: 13 MMOL/L
APPEARANCE: CLEAR
AST SERPL-CCNC: 17 U/L
BASOPHILS # BLD AUTO: 0.09 K/UL
BASOPHILS NFR BLD AUTO: 0.8 %
BILIRUB SERPL-MCNC: 0.3 MG/DL
BILIRUBIN URINE: NEGATIVE
BLOOD URINE: NEGATIVE
BUN SERPL-MCNC: 16 MG/DL
CALCIUM SERPL-MCNC: 9.7 MG/DL
CHLORIDE SERPL-SCNC: 104 MMOL/L
CHOLEST SERPL-MCNC: 104 MG/DL
CO2 SERPL-SCNC: 24 MMOL/L
COLOR: COLORLESS
CREAT SERPL-MCNC: 1.03 MG/DL
CRP SERPL-MCNC: <3 MG/L
EGFR: 88 ML/MIN/1.73M2
EOSINOPHIL # BLD AUTO: 0.23 K/UL
EOSINOPHIL NFR BLD AUTO: 2.1 %
ERYTHROCYTE [SEDIMENTATION RATE] IN BLOOD BY WESTERGREN METHOD: 6 MM/HR
ESTIMATED AVERAGE GLUCOSE: 120 MG/DL
FOLATE SERPL-MCNC: 12.9 NG/ML
GLUCOSE QUALITATIVE U: NEGATIVE
GLUCOSE SERPL-MCNC: 95 MG/DL
HBA1C MFR BLD HPLC: 5.8 %
HCT VFR BLD CALC: 46.6 %
HCV AB SER QL: NONREACTIVE
HCV S/CO RATIO: 0.1 S/CO
HDLC SERPL-MCNC: 55 MG/DL
HGB BLD-MCNC: 15.9 G/DL
IMM GRANULOCYTES NFR BLD AUTO: 0.6 %
KETONES URINE: NEGATIVE
LDLC SERPL CALC-MCNC: 23 MG/DL
LEUKOCYTE ESTERASE URINE: NEGATIVE
LYMPHOCYTES # BLD AUTO: 2.71 K/UL
LYMPHOCYTES NFR BLD AUTO: 24.9 %
MAN DIFF?: NORMAL
MCHC RBC-ENTMCNC: 28.6 PG
MCHC RBC-ENTMCNC: 34.1 GM/DL
MCV RBC AUTO: 83.8 FL
MONOCYTES # BLD AUTO: 0.67 K/UL
MONOCYTES NFR BLD AUTO: 6.2 %
NEUTROPHILS # BLD AUTO: 7.12 K/UL
NEUTROPHILS NFR BLD AUTO: 65.4 %
NITRITE URINE: NEGATIVE
NONHDLC SERPL-MCNC: 49 MG/DL
PH URINE: 6
PLATELET # BLD AUTO: 283 K/UL
POTASSIUM SERPL-SCNC: 3.8 MMOL/L
PROT SERPL-MCNC: 7.6 G/DL
PROTEIN URINE: NEGATIVE
PSA SERPL-MCNC: 1.11 NG/ML
RBC # BLD: 5.56 M/UL
RBC # FLD: 12.9 %
SODIUM SERPL-SCNC: 142 MMOL/L
SPECIFIC GRAVITY URINE: 1.01
T4 FREE SERPL-MCNC: 1.2 NG/DL
TRIGL SERPL-MCNC: 128 MG/DL
TSH SERPL-ACNC: 4.93 UIU/ML
UROBILINOGEN URINE: NORMAL
VIT B12 SERPL-MCNC: 317 PG/ML
WBC # FLD AUTO: 10.89 K/UL

## 2023-03-21 NOTE — CHART NOTE - NSCHARTNOTEFT_GEN_A_CORE
Patient is intolerant to statins and prescribed Repatha- therefore he was not discharged on a statin    Jonelle Palomares PA-C

## 2023-04-08 ENCOUNTER — NON-APPOINTMENT (OUTPATIENT)
Age: 53
End: 2023-04-08

## 2023-04-08 VITALS — BODY MASS INDEX: 31.83 KG/M2 | HEIGHT: 68 IN | WEIGHT: 210 LBS

## 2023-04-08 NOTE — HISTORY OF PRESENT ILLNESS
[Former] : Former [>=20 Pack-Years] : Twenty pack years or greater smoking history: Yes [TextBox_13] : Referred by Dr. Badillo\par \par Mr. BRANDEN BADILLO is a 53 year old man with a history of nicotine dependence\par \par  Over the telephone today we reviewed and confirmed that the patient meets screening eligibility criteria:\par \par -Age: 53 years old\par \par Smoking status:\par \par -Former smoker\par \par -Number of pack(s) per day: <1 ppd x 30, quit 4 years ago\par \par Mr. BADILLO denies any personal history of lung cancer. Denies any s/s of lung cancer. No lung cancer in a 1st degree relative. Denies any history of lung disease. Denies any history of occupational exposures\par  [YearQuit] : 2019 [PacksperYear] : 20

## 2023-04-08 NOTE — REASON FOR VISIT
[Home] : at home, [unfilled] , at the time of the visit. [Medical Office: ([facility])___] : at the medical office located in  [Verbal consent obtained from patient] : the patient, [unfilled] [Initial Evaluation] : an initial evaluation visit [Review of Eligibility] : review of eligibility [Low-Dose CT Screening Discussion] : low-dose CT lung cancer screening discussion [Virtual Visit] : virtual visit

## 2023-04-09 NOTE — DISCUSSION/SUMMARY
[FreeTextEntry1] : CAD- no sx s/p LAD PCI\par \par HTN- controlled\par \par HLD- myalgias on statins.  Consult with Dr Lord\par \par Followup in 3 mths\par \par Time spent reviewing history, cath films, exam, EKG, pt discussion and note writing [EKG obtained to assist in diagnosis and management of assessed problem(s)] : EKG obtained to assist in diagnosis and management of assessed problem(s)

## 2023-04-11 ENCOUNTER — OUTPATIENT (OUTPATIENT)
Dept: OUTPATIENT SERVICES | Facility: HOSPITAL | Age: 53
LOS: 1 days | End: 2023-04-11
Payer: COMMERCIAL

## 2023-04-11 ENCOUNTER — APPOINTMENT (OUTPATIENT)
Dept: CT IMAGING | Facility: HOSPITAL | Age: 53
End: 2023-04-11
Payer: COMMERCIAL

## 2023-04-11 DIAGNOSIS — Z95.5 PRESENCE OF CORONARY ANGIOPLASTY IMPLANT AND GRAFT: Chronic | ICD-10-CM

## 2023-04-11 DIAGNOSIS — Z87.891 PERSONAL HISTORY OF NICOTINE DEPENDENCE: ICD-10-CM

## 2023-04-11 PROCEDURE — 71271 CT THORAX LUNG CANCER SCR C-: CPT

## 2023-04-11 PROCEDURE — 71271 CT THORAX LUNG CANCER SCR C-: CPT | Mod: 26

## 2023-04-19 ENCOUNTER — NON-APPOINTMENT (OUTPATIENT)
Age: 53
End: 2023-04-19

## 2023-05-30 ENCOUNTER — RX RENEWAL (OUTPATIENT)
Age: 53
End: 2023-05-30

## 2023-06-14 ENCOUNTER — APPOINTMENT (OUTPATIENT)
Dept: CARDIOLOGY | Facility: CLINIC | Age: 53
End: 2023-06-14
Payer: COMMERCIAL

## 2023-06-14 ENCOUNTER — NON-APPOINTMENT (OUTPATIENT)
Age: 53
End: 2023-06-14

## 2023-06-14 VITALS
SYSTOLIC BLOOD PRESSURE: 125 MMHG | DIASTOLIC BLOOD PRESSURE: 83 MMHG | OXYGEN SATURATION: 96 % | BODY MASS INDEX: 31.37 KG/M2 | WEIGHT: 207 LBS | HEIGHT: 68 IN | HEART RATE: 79 BPM

## 2023-06-14 DIAGNOSIS — I25.10 ATHEROSCLEROTIC HEART DISEASE OF NATIVE CORONARY ARTERY W/OUT ANGINA PECTORIS: ICD-10-CM

## 2023-06-14 PROCEDURE — 99214 OFFICE O/P EST MOD 30 MIN: CPT | Mod: 25

## 2023-06-14 PROCEDURE — 93000 ELECTROCARDIOGRAM COMPLETE: CPT

## 2023-07-09 NOTE — HISTORY OF PRESENT ILLNESS
[FreeTextEntry1] : 53 year old male with h/o HTN, HLD, CAD s/p LCX PCI with moderate LAD and RCA small .  Pt doing well until several weeks ago when he noted new onset chest pressure and SOB with exertion.  Stress was positive and sx continue.  PCI of the LAD was performed and sx have resolved

## 2023-07-09 NOTE — PHYSICAL EXAM
[Well Developed] : well developed [Well Nourished] : well nourished [No Acute Distress] : no acute distress [Normal Conjunctiva] : normal conjunctiva [No Carotid Bruit] : no carotid bruit [Normal Venous Pressure] : normal venous pressure [Normal S1, S2] : normal S1, S2 [No Murmur] : no murmur [No Rub] : no rub [No Gallop] : no gallop [Clear Lung Fields] : clear lung fields [Good Air Entry] : good air entry [No Respiratory Distress] : no respiratory distress  [Soft] : abdomen soft [No Masses/organomegaly] : no masses/organomegaly [Non Tender] : non-tender [Normal Bowel Sounds] : normal bowel sounds [Normal Gait] : normal gait [No Edema] : no edema [No Cyanosis] : no cyanosis [No Clubbing] : no clubbing [No Varicosities] : no varicosities [No Rash] : no rash [No Skin Lesions] : no skin lesions [Moves all extremities] : moves all extremities [No Focal Deficits] : no focal deficits [Normal Speech] : normal speech [Alert and Oriented] : alert and oriented [Normal memory] : normal memory

## 2023-07-09 NOTE — DISCUSSION/SUMMARY
[FreeTextEntry1] : CAD- no sx s/p LAD PCI\par \par HTN- controlled\par \par HLD- myalgias on statins.  Consult with Dr Lord\par \par Followup in 6 mths\par \par Time spent reviewing history, cath films, exam, EKG, pt discussion and note writing [EKG obtained to assist in diagnosis and management of assessed problem(s)] : EKG obtained to assist in diagnosis and management of assessed problem(s)

## 2023-08-15 ENCOUNTER — RX RENEWAL (OUTPATIENT)
Age: 53
End: 2023-08-15

## 2023-10-18 ENCOUNTER — APPOINTMENT (OUTPATIENT)
Dept: FAMILY MEDICINE | Facility: CLINIC | Age: 53
End: 2023-10-18
Payer: COMMERCIAL

## 2023-10-18 VITALS
RESPIRATION RATE: 20 BRPM | BODY MASS INDEX: 31.37 KG/M2 | WEIGHT: 207 LBS | HEART RATE: 78 BPM | SYSTOLIC BLOOD PRESSURE: 135 MMHG | DIASTOLIC BLOOD PRESSURE: 70 MMHG | HEIGHT: 68 IN

## 2023-10-18 DIAGNOSIS — M12.9 ARTHROPATHY, UNSPECIFIED: ICD-10-CM

## 2023-10-18 PROCEDURE — 99214 OFFICE O/P EST MOD 30 MIN: CPT | Mod: 25

## 2023-10-18 PROCEDURE — 96372 THER/PROPH/DIAG INJ SC/IM: CPT

## 2023-10-18 PROCEDURE — 36415 COLL VENOUS BLD VENIPUNCTURE: CPT

## 2023-10-18 RX ORDER — CLOPIDOGREL BISULFATE 75 MG/1
75 TABLET, FILM COATED ORAL
Qty: 90 | Refills: 3 | Status: DISCONTINUED | COMMUNITY
Start: 2022-04-13 | End: 2023-10-18

## 2023-10-18 RX ORDER — METHYLPRED ACET/NACL,ISO-OS/PF 40 MG/ML
40 VIAL (ML) INJECTION
Qty: 1 | Refills: 0 | Status: COMPLETED | OUTPATIENT
Start: 2023-10-18

## 2023-10-18 RX ADMIN — METHYLPREDNISOLONE ACETATE 1 MG/ML: 40 INJECTION, SUSPENSION INTRA-ARTICULAR; INTRALESIONAL; INTRAMUSCULAR; SOFT TISSUE at 00:00

## 2023-10-19 LAB
ALBUMIN SERPL ELPH-MCNC: 4.3 G/DL
ALP BLD-CCNC: 82 U/L
ALT SERPL-CCNC: 11 U/L
ANION GAP SERPL CALC-SCNC: 12 MMOL/L
AST SERPL-CCNC: 14 U/L
BILIRUB SERPL-MCNC: 0.4 MG/DL
BUN SERPL-MCNC: 13 MG/DL
CALCIUM SERPL-MCNC: 9.5 MG/DL
CHLORIDE SERPL-SCNC: 102 MMOL/L
CHOLEST SERPL-MCNC: 75 MG/DL
CO2 SERPL-SCNC: 23 MMOL/L
CREAT SERPL-MCNC: 0.98 MG/DL
CRP SERPL-MCNC: 88 MG/L
EGFR: 93 ML/MIN/1.73M2
ERYTHROCYTE [SEDIMENTATION RATE] IN BLOOD BY WESTERGREN METHOD: 58 MM/HR
ESTIMATED AVERAGE GLUCOSE: 111 MG/DL
GLUCOSE SERPL-MCNC: 132 MG/DL
HBA1C MFR BLD HPLC: 5.5 %
HCT VFR BLD CALC: 43.5 %
HDLC SERPL-MCNC: 33 MG/DL
HGB BLD-MCNC: 14.3 G/DL
LDLC SERPL CALC-MCNC: 22 MG/DL
MCHC RBC-ENTMCNC: 27.8 PG
MCHC RBC-ENTMCNC: 32.9 GM/DL
MCV RBC AUTO: 84.5 FL
NONHDLC SERPL-MCNC: 42 MG/DL
PLATELET # BLD AUTO: 468 K/UL
POTASSIUM SERPL-SCNC: 4.4 MMOL/L
PROT SERPL-MCNC: 7.3 G/DL
RBC # BLD: 5.15 M/UL
RBC # FLD: 12.6 %
SODIUM SERPL-SCNC: 137 MMOL/L
TRIGL SERPL-MCNC: 108 MG/DL
URATE SERPL-MCNC: 6.1 MG/DL
WBC # FLD AUTO: 11.13 K/UL

## 2023-10-20 LAB
CCP AB SER IA-ACNC: <8 UNITS
RF+CCP IGG SER-IMP: NEGATIVE
RHEUMATOID FACT SER QL: 10 IU/ML

## 2023-11-03 ENCOUNTER — RX RENEWAL (OUTPATIENT)
Age: 53
End: 2023-11-03

## 2023-11-09 ENCOUNTER — APPOINTMENT (OUTPATIENT)
Dept: RHEUMATOLOGY | Facility: CLINIC | Age: 53
End: 2023-11-09
Payer: COMMERCIAL

## 2023-11-09 ENCOUNTER — NON-APPOINTMENT (OUTPATIENT)
Age: 53
End: 2023-11-09

## 2023-11-09 VITALS
WEIGHT: 202 LBS | BODY MASS INDEX: 30.62 KG/M2 | SYSTOLIC BLOOD PRESSURE: 118 MMHG | HEART RATE: 73 BPM | OXYGEN SATURATION: 97 % | RESPIRATION RATE: 18 BRPM | HEIGHT: 68 IN | TEMPERATURE: 97.5 F | DIASTOLIC BLOOD PRESSURE: 78 MMHG

## 2023-11-09 DIAGNOSIS — M77.02 MEDIAL EPICONDYLITIS, LEFT ELBOW: ICD-10-CM

## 2023-11-09 DIAGNOSIS — R70.0 ELEVATED ERYTHROCYTE SEDIMENTATION RATE: ICD-10-CM

## 2023-11-09 PROCEDURE — 99204 OFFICE O/P NEW MOD 45 MIN: CPT

## 2023-11-14 ENCOUNTER — OUTPATIENT (OUTPATIENT)
Dept: OUTPATIENT SERVICES | Facility: HOSPITAL | Age: 53
LOS: 1 days | End: 2023-11-14
Payer: COMMERCIAL

## 2023-11-14 ENCOUNTER — APPOINTMENT (OUTPATIENT)
Dept: MRI IMAGING | Facility: HOSPITAL | Age: 53
End: 2023-11-14
Payer: COMMERCIAL

## 2023-11-14 DIAGNOSIS — Z95.5 PRESENCE OF CORONARY ANGIOPLASTY IMPLANT AND GRAFT: Chronic | ICD-10-CM

## 2023-11-14 DIAGNOSIS — M65.9 SYNOVITIS AND TENOSYNOVITIS, UNSPECIFIED: ICD-10-CM

## 2023-11-14 PROCEDURE — 73721 MRI JNT OF LWR EXTRE W/O DYE: CPT

## 2023-11-14 PROCEDURE — 73721 MRI JNT OF LWR EXTRE W/O DYE: CPT | Mod: 26,LT

## 2023-11-28 ENCOUNTER — APPOINTMENT (OUTPATIENT)
Dept: RHEUMATOLOGY | Facility: CLINIC | Age: 53
End: 2023-11-28

## 2023-12-07 ENCOUNTER — APPOINTMENT (OUTPATIENT)
Dept: RHEUMATOLOGY | Facility: CLINIC | Age: 53
End: 2023-12-07
Payer: COMMERCIAL

## 2023-12-07 VITALS
HEART RATE: 82 BPM | TEMPERATURE: 97.3 F | DIASTOLIC BLOOD PRESSURE: 78 MMHG | RESPIRATION RATE: 18 BRPM | SYSTOLIC BLOOD PRESSURE: 118 MMHG | WEIGHT: 203 LBS | HEIGHT: 68 IN | OXYGEN SATURATION: 99 % | BODY MASS INDEX: 30.77 KG/M2

## 2023-12-07 DIAGNOSIS — M77.52 OTHER ENTHESOPATHY OF LT FOOT AND ANKLE: ICD-10-CM

## 2023-12-07 DIAGNOSIS — M19.072 PRIMARY OSTEOARTHRITIS, LEFT ANKLE AND FOOT: ICD-10-CM

## 2023-12-07 DIAGNOSIS — M25.50 PAIN IN UNSPECIFIED JOINT: ICD-10-CM

## 2023-12-07 LAB
ACE BLD-CCNC: 28 U/L
CRP SERPL-MCNC: 5 MG/L
ERYTHROCYTE [SEDIMENTATION RATE] IN BLOOD BY WESTERGREN METHOD: < 2 MM/HR
FERRITIN SERPL-MCNC: 458 NG/ML
HLA-B27 QL NAA+PROBE: NORMAL
IRON SATN MFR SERPL: 25 %
IRON SERPL-MCNC: 83 UG/DL
TIBC SERPL-MCNC: 333 UG/DL
UIBC SERPL-MCNC: 250 UG/DL
URATE SERPL-MCNC: 7.8 MG/DL

## 2023-12-07 PROCEDURE — 99214 OFFICE O/P EST MOD 30 MIN: CPT

## 2023-12-07 RX ORDER — PREDNISONE 5 MG/1
5 TABLET ORAL
Qty: 45 | Refills: 2 | Status: DISCONTINUED | COMMUNITY
Start: 2023-11-09 | End: 2023-12-07

## 2023-12-07 RX ORDER — COLCHICINE 0.6 MG/1
0.6 TABLET ORAL
Qty: 30 | Refills: 1 | Status: DISCONTINUED | COMMUNITY
Start: 2022-07-19 | End: 2023-12-07

## 2023-12-08 PROBLEM — M25.50 DIFFUSE ARTHRALGIA: Status: ACTIVE | Noted: 2023-12-08

## 2023-12-13 ENCOUNTER — EMERGENCY (EMERGENCY)
Facility: HOSPITAL | Age: 53
LOS: 1 days | Discharge: HOME | End: 2023-12-13
Attending: STUDENT IN AN ORGANIZED HEALTH CARE EDUCATION/TRAINING PROGRAM
Payer: COMMERCIAL

## 2023-12-13 VITALS
DIASTOLIC BLOOD PRESSURE: 95 MMHG | OXYGEN SATURATION: 97 % | RESPIRATION RATE: 18 BRPM | HEART RATE: 73 BPM | SYSTOLIC BLOOD PRESSURE: 144 MMHG

## 2023-12-13 VITALS
SYSTOLIC BLOOD PRESSURE: 138 MMHG | OXYGEN SATURATION: 98 % | WEIGHT: 207.01 LBS | HEART RATE: 72 BPM | DIASTOLIC BLOOD PRESSURE: 91 MMHG | RESPIRATION RATE: 18 BRPM | TEMPERATURE: 98 F

## 2023-12-13 DIAGNOSIS — Z95.5 PRESENCE OF CORONARY ANGIOPLASTY IMPLANT AND GRAFT: Chronic | ICD-10-CM

## 2023-12-13 LAB
ALBUMIN SERPL ELPH-MCNC: 4.7 G/DL — SIGNIFICANT CHANGE UP (ref 3.3–5)
ALP SERPL-CCNC: 85 U/L — SIGNIFICANT CHANGE UP (ref 40–120)
ALT FLD-CCNC: 18 U/L — SIGNIFICANT CHANGE UP (ref 10–45)
ANION GAP SERPL CALC-SCNC: 10 MMOL/L — SIGNIFICANT CHANGE UP (ref 5–17)
AST SERPL-CCNC: 16 U/L — SIGNIFICANT CHANGE UP (ref 10–40)
BASOPHILS # BLD AUTO: 0.08 K/UL — SIGNIFICANT CHANGE UP (ref 0–0.2)
BASOPHILS # BLD AUTO: 0.08 K/UL — SIGNIFICANT CHANGE UP (ref 0–0.2)
BASOPHILS NFR BLD AUTO: 0.6 % — SIGNIFICANT CHANGE UP (ref 0–2)
BASOPHILS NFR BLD AUTO: 0.6 % — SIGNIFICANT CHANGE UP (ref 0–2)
BILIRUB SERPL-MCNC: 0.4 MG/DL — SIGNIFICANT CHANGE UP (ref 0.2–1.2)
BUN SERPL-MCNC: 10 MG/DL — SIGNIFICANT CHANGE UP (ref 7–23)
CALCIUM SERPL-MCNC: 9.4 MG/DL — SIGNIFICANT CHANGE UP (ref 8.4–10.5)
CHLORIDE SERPL-SCNC: 104 MMOL/L — SIGNIFICANT CHANGE UP (ref 96–108)
CO2 SERPL-SCNC: 26 MMOL/L — SIGNIFICANT CHANGE UP (ref 22–31)
CREAT SERPL-MCNC: 0.99 MG/DL — SIGNIFICANT CHANGE UP (ref 0.5–1.3)
EGFR: 92 ML/MIN/1.73M2 — SIGNIFICANT CHANGE UP
EOSINOPHIL # BLD AUTO: 0.41 K/UL — SIGNIFICANT CHANGE UP (ref 0–0.5)
EOSINOPHIL # BLD AUTO: 0.41 K/UL — SIGNIFICANT CHANGE UP (ref 0–0.5)
EOSINOPHIL NFR BLD AUTO: 3.1 % — SIGNIFICANT CHANGE UP (ref 0–6)
EOSINOPHIL NFR BLD AUTO: 3.1 % — SIGNIFICANT CHANGE UP (ref 0–6)
GLUCOSE SERPL-MCNC: 100 MG/DL — HIGH (ref 70–99)
HCT VFR BLD CALC: 47.4 % — SIGNIFICANT CHANGE UP (ref 39–50)
HCT VFR BLD CALC: 47.4 % — SIGNIFICANT CHANGE UP (ref 39–50)
HGB BLD-MCNC: 15.9 G/DL — SIGNIFICANT CHANGE UP (ref 13–17)
HGB BLD-MCNC: 15.9 G/DL — SIGNIFICANT CHANGE UP (ref 13–17)
IMM GRANULOCYTES NFR BLD AUTO: 0.7 % — SIGNIFICANT CHANGE UP (ref 0–0.9)
IMM GRANULOCYTES NFR BLD AUTO: 0.7 % — SIGNIFICANT CHANGE UP (ref 0–0.9)
LIDOCAIN IGE QN: 47 U/L — SIGNIFICANT CHANGE UP (ref 7–60)
LYMPHOCYTES # BLD AUTO: 24.4 % — SIGNIFICANT CHANGE UP (ref 13–44)
LYMPHOCYTES # BLD AUTO: 24.4 % — SIGNIFICANT CHANGE UP (ref 13–44)
LYMPHOCYTES # BLD AUTO: 3.26 K/UL — SIGNIFICANT CHANGE UP (ref 1–3.3)
LYMPHOCYTES # BLD AUTO: 3.26 K/UL — SIGNIFICANT CHANGE UP (ref 1–3.3)
MAGNESIUM SERPL-MCNC: 2.1 MG/DL — SIGNIFICANT CHANGE UP (ref 1.6–2.6)
MCHC RBC-ENTMCNC: 28 PG — SIGNIFICANT CHANGE UP (ref 27–34)
MCHC RBC-ENTMCNC: 28 PG — SIGNIFICANT CHANGE UP (ref 27–34)
MCHC RBC-ENTMCNC: 33.5 GM/DL — SIGNIFICANT CHANGE UP (ref 32–36)
MCHC RBC-ENTMCNC: 33.5 GM/DL — SIGNIFICANT CHANGE UP (ref 32–36)
MCV RBC AUTO: 83.5 FL — SIGNIFICANT CHANGE UP (ref 80–100)
MCV RBC AUTO: 83.5 FL — SIGNIFICANT CHANGE UP (ref 80–100)
MONOCYTES # BLD AUTO: 0.7 K/UL — SIGNIFICANT CHANGE UP (ref 0–0.9)
MONOCYTES # BLD AUTO: 0.7 K/UL — SIGNIFICANT CHANGE UP (ref 0–0.9)
MONOCYTES NFR BLD AUTO: 5.2 % — SIGNIFICANT CHANGE UP (ref 2–14)
MONOCYTES NFR BLD AUTO: 5.2 % — SIGNIFICANT CHANGE UP (ref 2–14)
NEUTROPHILS # BLD AUTO: 8.82 K/UL — HIGH (ref 1.8–7.4)
NEUTROPHILS # BLD AUTO: 8.82 K/UL — HIGH (ref 1.8–7.4)
NEUTROPHILS NFR BLD AUTO: 66 % — SIGNIFICANT CHANGE UP (ref 43–77)
NEUTROPHILS NFR BLD AUTO: 66 % — SIGNIFICANT CHANGE UP (ref 43–77)
NRBC # BLD: 0 /100 WBCS — SIGNIFICANT CHANGE UP (ref 0–0)
NRBC # BLD: 0 /100 WBCS — SIGNIFICANT CHANGE UP (ref 0–0)
NT-PROBNP SERPL-SCNC: <36 PG/ML — SIGNIFICANT CHANGE UP (ref 0–300)
PLATELET # BLD AUTO: 277 K/UL — SIGNIFICANT CHANGE UP (ref 150–400)
PLATELET # BLD AUTO: 277 K/UL — SIGNIFICANT CHANGE UP (ref 150–400)
POTASSIUM SERPL-MCNC: 3.9 MMOL/L — SIGNIFICANT CHANGE UP (ref 3.5–5.3)
POTASSIUM SERPL-SCNC: 3.9 MMOL/L — SIGNIFICANT CHANGE UP (ref 3.5–5.3)
PROT SERPL-MCNC: 7.6 G/DL — SIGNIFICANT CHANGE UP (ref 6–8.3)
RBC # BLD: 5.68 M/UL — SIGNIFICANT CHANGE UP (ref 4.2–5.8)
RBC # BLD: 5.68 M/UL — SIGNIFICANT CHANGE UP (ref 4.2–5.8)
RBC # FLD: 13.5 % — SIGNIFICANT CHANGE UP (ref 10.3–14.5)
RBC # FLD: 13.5 % — SIGNIFICANT CHANGE UP (ref 10.3–14.5)
SODIUM SERPL-SCNC: 140 MMOL/L — SIGNIFICANT CHANGE UP (ref 135–145)
TROPONIN T, HIGH SENSITIVITY RESULT: 12 NG/L — SIGNIFICANT CHANGE UP (ref 0–51)
TROPONIN T, HIGH SENSITIVITY RESULT: 17 NG/L — SIGNIFICANT CHANGE UP (ref 0–51)
TROPONIN T, HIGH SENSITIVITY RESULT: 17 NG/L — SIGNIFICANT CHANGE UP (ref 0–51)
WBC # BLD: 13.37 K/UL — HIGH (ref 3.8–10.5)
WBC # BLD: 13.37 K/UL — HIGH (ref 3.8–10.5)
WBC # FLD AUTO: 13.37 K/UL — HIGH (ref 3.8–10.5)
WBC # FLD AUTO: 13.37 K/UL — HIGH (ref 3.8–10.5)

## 2023-12-13 PROCEDURE — 99285 EMERGENCY DEPT VISIT HI MDM: CPT | Mod: 25

## 2023-12-13 PROCEDURE — 71046 X-RAY EXAM CHEST 2 VIEWS: CPT

## 2023-12-13 PROCEDURE — 83690 ASSAY OF LIPASE: CPT

## 2023-12-13 PROCEDURE — 99285 EMERGENCY DEPT VISIT HI MDM: CPT

## 2023-12-13 PROCEDURE — 83735 ASSAY OF MAGNESIUM: CPT

## 2023-12-13 PROCEDURE — 71046 X-RAY EXAM CHEST 2 VIEWS: CPT | Mod: 26

## 2023-12-13 PROCEDURE — 84484 ASSAY OF TROPONIN QUANT: CPT

## 2023-12-13 PROCEDURE — 93005 ELECTROCARDIOGRAM TRACING: CPT

## 2023-12-13 PROCEDURE — 36415 COLL VENOUS BLD VENIPUNCTURE: CPT

## 2023-12-13 PROCEDURE — 85025 COMPLETE CBC W/AUTO DIFF WBC: CPT

## 2023-12-13 PROCEDURE — 80053 COMPREHEN METABOLIC PANEL: CPT

## 2023-12-13 PROCEDURE — 83880 ASSAY OF NATRIURETIC PEPTIDE: CPT

## 2023-12-13 NOTE — ED PROVIDER NOTE - PATIENT PORTAL LINK FT
You can access the FollowMyHealth Patient Portal offered by [facility] by registering at the following website: [email or web address]. By joining [email or web address]’s FollowMyHealth portal, you will also be able to view your health information using other applications (apps) compatible with our system. You can access the FollowMyHealth Patient Portal offered by [facility] by registering at the following website: [email or web address]. By joining [employer]’s FollowMyHealth portal, you will also be able to view your health information using other applications (apps) compatible with our system.

## 2023-12-13 NOTE — CONSULT NOTE ADULT - SUBJECTIVE AND OBJECTIVE BOX
Johnny Mckenzie MD  Cardiology Fellow  All Cardiology service information can be found 24/7 on [email or web address], password: [email or web address]    Patient seen and evaluated at bedside    Chief Complaint: Chest Pain    HPI:  54 yo M with PMHx HLD, NSTEMI with CAD s/p PCI to LCx (4/2022) then PCI to LAD in 2/2023 with RCA , no longer on P2Y12 inhibitor. Presents for two hours of left sided chest discomfort this AM that occurred while driving. It was intermittent during this period, occurring for about 20 minutes each time. It did not radiate and was not associated with SOB or any other symptoms. He thought it was a familiar sensation to the sensation he had prior to having stents 2 years ago. 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    He called Dr. Moya's office who recommended coming to the ED for evaluation.    He reports that the pain resolved around noon and that he is entirely asymptomatic at this time in the ED.    In ED, HR 72, /91, SpO2 98% on RA, RR 18, EKG NSR with no ST changes or signs of ischemia, troponin 12->17.    PMHx:   DM (diabetes mellitus)    High cholesterol    COVID-19 vaccine series completed    NSTEMI (non-ST elevated myocardial infarction)    CAD in native artery    NSTEMI (non-ST elevated myocardial infarction)        PSHx:   Coronary stent patent        Allergies:  No Known Allergies      Home Medications:  ezetimibe 10 mg oral tablet: 1 tab(s) orally once a day (15 Feb 2023 08:34)  Ozempic 2 mg/1.5 mL (1 mg dose) subcutaneous solution: 1 unit(s) subcutaneous once a week SUNDAY (15 Feb 2023 08:34)  Repatha 140 mg/mL subcutaneous solution: 140 milligram(s) subcutaneous every other week LAST DOSE 2/13 (15 Feb 2023 08:34)      Current Medications:       FAMILY HISTORY:      Social History:  Smoking History: Denies  Alcohol Use: Occasional  Drug Use:    REVIEW OF SYSTEMS:  CONSTITUTIONAL: No weakness, fevers or chills  NECK: No pain or stiffness  RESPIRATORY: No cough, wheezing, hemoptysis; No shortness of breath  CARDIOVASCULAR: +chest pain. No palpitations; No lower extremity edema  MSK: Occasional ankle pain  All other review of systems is negative unless indicated above.    Physical Exam:  T(F): 98.1 (12-13), Max: 98.1 (12-13)  HR: 73 (12-13) (72 - 73)  BP: 144/95 (12-13) (138/91 - 144/95)  RR: 18 (12-13)  SpO2: 97% (12-13)  GENERAL: No acute distress, well-developed  HEAD:  Atraumatic, Normocephalic  ENT: EOMI, PERRLA, conjunctiva and sclera clear, Neck supple, No JVD, moist mucosa  CHEST/LUNG: Clear to auscultation bilaterally; No wheeze, equal breath sounds bilaterally   HEART: Regular rate and rhythm; No murmurs, rubs, or gallops  ABDOMEN: Soft, Nontender, Nondistended;  EXTREMITIES:  No clubbing, cyanosis, or edema  PSYCH: Nl behavior, nl affect  NEUROLOGY: AAOx3, non-focal, cranial nerves intact  SKIN: Normal color, No rashes or lesions    LINES    Cardiovascular Diagnostic Testing:    ECG: Personally reviewed:  NSR, no ST elevations or depressions    Echo: Personally reviewed:    Stress Testing:    Cath:  < from: Cardiac Catheterization (02.15.23 @ 09:49) >  Diagnostic Findings:     Coronary Angiography   The coronary circulation is left dominant.      LM   Left main artery: Angiography shows no disease.      LAD   Proximal left anterior descending: There is a 70 % stenosis. Instant  wave-free ratio was performed with a calculated value of  0.79. Based on the results, the lesion was judged to be significant  and an intervention was performed.    CX   Circumflex: Angiography shows mild atherosclerosis. Prior stent is  patent.    RCA   Mid right coronary artery: There is a 100 % stenosis.      < end of copied text >      Imaging:    CXR: Personally reviewed  < from: Xray Chest 2 Views PA/Lat (12.13.23 @ 16:27) >  FINDINGS:    The heart size is normal.  The lungs are clear.  There is no pneumothorax or pleural effusion.    IMPRESSION:  Clear lungs.    < end of copied text >      Labs: Personally reviewed                        15.9   13.37 )-----------( 277      ( 13 Dec 2023 15:54 )             47.4     12-13    140  |  104  |  10  ----------------------------<  100<H>  3.9   |  26  |  0.99    Ca    9.4      13 Dec 2023 15:54  Mg     2.1     12-13    TPro  7.6  /  Alb  4.7  /  TBili  0.4  /  DBili  x   /  AST  16  /  ALT  18  /  AlkPhos  85  12-13        CARDIAC MARKERS ( 13 Dec 2023 17:56 )  17 ng/L / x     / x     / x     / x     / x      CARDIAC MARKERS ( 13 Dec 2023 15:54 )  12 ng/L / x     / x     / x     / x     / x                     Johnny Mckenzie MD  Cardiology Fellow  All Cardiology service information can be found 24/7 on [email or web address], password: [email or web address]    Patient seen and evaluated at bedside    Chief Complaint: Chest Pain    HPI:  52 yo M with PMHx HLD, NSTEMI with CAD s/p PCI to LCx (4/2022) then PCI to LAD in 2/2023 with RCA , no longer on P2Y12 inhibitor. Presents for two hours of left sided chest discomfort this AM that occurred while driving. It was intermittent during this period, occurring for about 20 minutes each time. It did not radiate and was not associated with SOB or any other symptoms. He thought it was a familiar sensation to the sensation he had prior to having stents 2 years ago. 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    He called Dr. Moya's office who recommended coming to the ED for evaluation.    He reports that the pain resolved around noon and that he is entirely asymptomatic at this time in the ED.    In ED, HR 72, /91, SpO2 98% on RA, RR 18, EKG NSR with no ST changes or signs of ischemia, troponin 12->17.    PMHx:   DM (diabetes mellitus)    High cholesterol    COVID-19 vaccine series completed    NSTEMI (non-ST elevated myocardial infarction)    CAD in native artery    NSTEMI (non-ST elevated myocardial infarction)        PSHx:   Coronary stent patent        Allergies:  No Known Allergies      Home Medications:  ezetimibe 10 mg oral tablet: 1 tab(s) orally once a day (15 Feb 2023 08:34)  Ozempic 2 mg/1.5 mL (1 mg dose) subcutaneous solution: 1 unit(s) subcutaneous once a week SUNDAY (15 Feb 2023 08:34)  Repatha 140 mg/mL subcutaneous solution: 140 milligram(s) subcutaneous every other week LAST DOSE 2/13 (15 Feb 2023 08:34)      Current Medications:       FAMILY HISTORY:      Social History:  Smoking History: Denies  Alcohol Use: Occasional  Drug Use:    REVIEW OF SYSTEMS:  CONSTITUTIONAL: No weakness, fevers or chills  NECK: No pain or stiffness  RESPIRATORY: No cough, wheezing, hemoptysis; No shortness of breath  CARDIOVASCULAR: +chest pain. No palpitations; No lower extremity edema  MSK: Occasional ankle pain  All other review of systems is negative unless indicated above.    Physical Exam:  T(F): 98.1 (12-13), Max: 98.1 (12-13)  HR: 73 (12-13) (72 - 73)  BP: 144/95 (12-13) (138/91 - 144/95)  RR: 18 (12-13)  SpO2: 97% (12-13)  GENERAL: No acute distress, well-developed  HEAD:  Atraumatic, Normocephalic  ENT: EOMI, PERRLA, conjunctiva and sclera clear, Neck supple, No JVD, moist mucosa  CHEST/LUNG: Clear to auscultation bilaterally; No wheeze, equal breath sounds bilaterally   HEART: Regular rate and rhythm; No murmurs, rubs, or gallops  ABDOMEN: Soft, Nontender, Nondistended;  EXTREMITIES:  No clubbing, cyanosis, or edema  PSYCH: Nl behavior, nl affect  NEUROLOGY: AAOx3, non-focal, cranial nerves intact  SKIN: Normal color, No rashes or lesions    LINES    Cardiovascular Diagnostic Testing:    ECG: Personally reviewed:  NSR, no ST elevations or depressions    Echo: Personally reviewed:    Stress Testing:    Cath:  < from: Cardiac Catheterization (02.15.23 @ 09:49) >  Diagnostic Findings:     Coronary Angiography   The coronary circulation is left dominant.      LM   Left main artery: Angiography shows no disease.      LAD   Proximal left anterior descending: There is a 70 % stenosis. Instant  wave-free ratio was performed with a calculated value of  0.79. Based on the results, the lesion was judged to be significant  and an intervention was performed.    CX   Circumflex: Angiography shows mild atherosclerosis. Prior stent is  patent.    RCA   Mid right coronary artery: There is a 100 % stenosis.      < end of copied text >      Imaging:    CXR: Personally reviewed  < from: Xray Chest 2 Views PA/Lat (12.13.23 @ 16:27) >  FINDINGS:    The heart size is normal.  The lungs are clear.  There is no pneumothorax or pleural effusion.    IMPRESSION:  Clear lungs.    < end of copied text >      Labs: Personally reviewed                        15.9   13.37 )-----------( 277      ( 13 Dec 2023 15:54 )             47.4     12-13    140  |  104  |  10  ----------------------------<  100<H>  3.9   |  26  |  0.99    Ca    9.4      13 Dec 2023 15:54  Mg     2.1     12-13    TPro  7.6  /  Alb  4.7  /  TBili  0.4  /  DBili  x   /  AST  16  /  ALT  18  /  AlkPhos  85  12-13        CARDIAC MARKERS ( 13 Dec 2023 17:56 )  17 ng/L / x     / x     / x     / x     / x      CARDIAC MARKERS ( 13 Dec 2023 15:54 )  12 ng/L / x     / x     / x     / x     / x

## 2023-12-13 NOTE — ED ADULT NURSE NOTE - NSFALLUNIVINTERV_ED_ALL_ED
Bed/Stretcher in lowest position, wheels locked, appropriate side rails in place/Call bell, personal items and telephone in reach/Instruct patient to call for assistance before getting out of bed/chair/stretcher/Non-slip footwear applied when patient is off stretcher/[place] to call system/Physically safe environment - no spills, clutter or unnecessary equipment/Purposeful proactive rounding/Room/bathroom lighting operational, light cord in reach Bed/Stretcher in lowest position, wheels locked, appropriate side rails in place/Call bell, personal items and telephone in reach/Instruct patient to call for assistance before getting out of bed/chair/stretcher/Non-slip footwear applied when patient is off stretcher/[place] to call system/Physically safe environment - no spills, clutter or unnecessary equipment/Purposeful proactive rounding/Room/bathroom lighting operational, light cord in reach

## 2023-12-13 NOTE — ED PROVIDER NOTE - RAPID ASSESSMENT
53-year-old male history of coronary artery disease with stents presenting with 1 day of left-sided chest tightness that occurred at rest.  Patient still has the pain not made worse by exertion.  No cough no fevers no chills.  No belly pain nausea vomiting diarrhea.  Patient was rapidly assessed via a telemedicine and/or role of Quick Triage Doctor; a limited history, physical exam and assessment was performed. The patient will be seen and further evaluated in the main emergency department. The remainder of care and evaluation will be conducted by the primary emergency medicine team. Receiving team will follow up on labs, imaging and serially reassess patient as indicated. All further decisions regarding patient care, evaluation and disposition are at the discretion of the receiving primary emergency department team.

## 2023-12-13 NOTE — ED PROVIDER NOTE - PROGRESS NOTE ADDITIONAL1
Ila Ramirezo called did not say where she was from is looking for the status of pt's insulin pump and glucose monitor.
Left message for [employer] informing referral pending. Awaiting approval from insurance company. Once approval received, order will be faxed.
Additional Progress Note...

## 2023-12-13 NOTE — ED PROVIDER NOTE - CLINICAL SUMMARY MEDICAL DECISION MAKING FREE TEXT BOX
I was the supervising attending. I have independently seen face-to-face and examined the patient. I have reviewed the history and physical and discussed the MDM with the resident, fellow, CHUYITA and/or student. I agree with the assessment and plan as presented unless otherwise documented as follows:    53M hx DM, HTN, HLD, CAD with NSTEMI/stent to LCX 4/2022 and abnormal stress/elective cath with stent to LAD 2/2023, DAPT d/c'ed 9/2023, presenting with chest tightness. Symptoms occurred while patient was driving to wife's workplace at approximately 10am, L-sided chest tightness with radiation to R chest, no associated symptoms, still present in ED at approximately 1pm but resolved since then. Denies SOB, nausea/vomiting, diaphoresis. Appears well, AOx3, not in acute distress. EKG x2 no concerning ischemic changes. Workup initiated by QPA reviewed, notable for troponin 12, mild leukocytosis. 2nd troponin sent, will consult cardiology. Likely will require admission for ACS r/o. -Madiha Fulton MD (Attending)

## 2023-12-13 NOTE — ED PROVIDER NOTE - NSICDXPASTMEDICALHX_GEN_ALL_CORE_FT
PAST MEDICAL HISTORY:  CAD in native artery     COVID-19 vaccine series completed     DM (diabetes mellitus)     High cholesterol     NSTEMI (non-ST elevated myocardial infarction)

## 2023-12-13 NOTE — ED PROVIDER NOTE - PROGRESS NOTE DETAILS
YAN Wilson: cards at bedside YAN Wilson: patient prefers discharge home. made cardiology fellow aware who discussed case with Dr. Hiram Moya who stated outpatient f/u. Has an appointment in two weeks with Dr. Moya. strict return precautions provided. 2nd troponin 17 from 12. Patient seen by cardiology fellow, feels that chest pain unlikely to be ACS given nature of symptoms, reassuring ECG and delta-troponin. Discussed results with patient, who states he greatly desires to go home and follow up as outpatient. Discussed risks/benefits of staying in hospital for further testing to rule out ACS, although less likely given reassuring initial testing as above, vs. following up as outpatient with potential delay to care, decompensation. Patient expresses understanding and still prefers to go home. Discussed importance of following up as outpatient and to return if recurrent chest pain. -Madiha Fulton MD (Attending)

## 2023-12-13 NOTE — CONSULT NOTE ADULT - ASSESSMENT
52 yo M with PMHx HLD, NSTEMI with CAD s/p PCI to LCx (4/2022) then PCI to LAD in 2/2023 with RCA , no longer on P2Y12 inhibitor who presents with an episode of chest discomfort that occurred today. Chest discomfort was non-exertional and not associated with any other symptoms. Given non-ischemic ECG and normal-range troponin, as well as resolution of symptoms, this is unlikely to be ACS. He is adamant that he does not want to stay for further testing and reports he wants to obtain any testing as an outpatient.     - Discussed with Dr. Moya who reports he will see him as an outpatient. Patient has follow up appointment January 3. However should try to obtain earlier appt.   - Continue ASA and all other home meds  - If he develops recurrent chest pain, should return to ED immediately    Johnny Mckenzie MD  Cardiology Fellow  All Cardiology service information can be found 24/7 on [email or web address], password: [email or web address]"[email or web address]"     ***Note not finalized until co-signed by attending***     54 yo M with PMHx HLD, NSTEMI with CAD s/p PCI to LCx (4/2022) then PCI to LAD in 2/2023 with RCA , no longer on P2Y12 inhibitor who presents with an episode of chest discomfort that occurred today. Chest discomfort was non-exertional and not associated with any other symptoms. Given non-ischemic ECG and normal-range troponin, as well as resolution of symptoms, this is unlikely to be ACS. He is adamant that he does not want to stay for further testing and reports he wants to obtain any testing as an outpatient.     - Discussed with Dr. Moya who reports he will see him as an outpatient. Patient has follow up appointment January 3. However should try to obtain earlier appt.   - Continue ASA and all other home meds  - If he develops recurrent chest pain, should return to ED immediately    Johnny Mckenzie MD  Cardiology Fellow  All Cardiology service information can be found 24/7 on [email or web address], password: [email or web address]     ***Note not finalized until co-signed by attending***

## 2023-12-13 NOTE — ED ADULT NURSE NOTE - OBJECTIVE STATEMENT
1800 52 y/o m to er w/wife from wr w/c/o resolved chest tightness that he had earlier today. when he had the pain pt denied sob,n/v/diaporesis,or rad of the pain. ivl placed by QRN while waiting in the wr. 1800 54 y/o m to er w/wife from wr w/c/o resolved chest tightness that he had earlier today. when he had the pain pt denied sob,n/v/diaporesis,or rad of the pain. ivl placed by QRN while waiting in the wr. 1800 54 y/o m to er w/wife from wr w/c/o resolved chest tightness that he had earlier today. when he had the pain pt denied sob,n/v/diaporesis,or rad of the pain. ivl placed by QRN while waiting in the [id number] seen and eval by cards 1800 52 y/o m to er w/wife from wr w/c/o resolved chest tightness that he had earlier today. when he had the pain pt denied sob,n/v/diaporesis,or rad of the pain. ivl placed by QRN while waiting in the [id number] seen and eval by cards

## 2023-12-13 NOTE — ED ADULT TRIAGE NOTE - CHIEF COMPLAINT QUOTE
54 yo M pt with 2 cardiac stents placed last was last Feb p/w chest pain today intermittent while at rest. 52 yo M pt with 2 cardiac stents placed last was last Feb p/w chest pain today intermittent while at rest.

## 2023-12-13 NOTE — ED PROVIDER NOTE - OBJECTIVE STATEMENT
54 y/o PMH of HTN, HLD, CAD s/p stent last cath 2/2023 completed plavix 9/2023, DM now directed to the ED by cards office Dr. Hiram Moya as patient c/o intermittent chest tightness similar to previous stent in February. Patient reports its non exertional lasts few min at a time and occasionally radiates to the right side. Patient currently chest pain free at 1830. Denied SOB, abdominal pain, N/V/D, syncope, palpitations, headache, back pain 52 y/o PMH of HTN, HLD, CAD s/p stent last cath 2/2023 completed plavix 9/2023, DM now directed to the ED by cards office Dr. Hiram Moya as patient c/o intermittent chest tightness similar to previous stent in February. Patient reports its non exertional lasts few min at a time and occasionally radiates to the right side. Patient currently chest pain free at 1830. Denied SOB, abdominal pain, N/V/D, syncope, palpitations, headache, back pain

## 2023-12-18 ENCOUNTER — APPOINTMENT (OUTPATIENT)
Dept: CARDIOLOGY | Facility: CLINIC | Age: 53
End: 2023-12-18
Payer: COMMERCIAL

## 2023-12-18 VITALS
WEIGHT: 203 LBS | SYSTOLIC BLOOD PRESSURE: 118 MMHG | DIASTOLIC BLOOD PRESSURE: 82 MMHG | BODY MASS INDEX: 30.77 KG/M2 | HEART RATE: 73 BPM | OXYGEN SATURATION: 96 % | HEIGHT: 68 IN

## 2023-12-18 DIAGNOSIS — R05.3 CHRONIC COUGH: ICD-10-CM

## 2023-12-18 PROCEDURE — 93000 ELECTROCARDIOGRAM COMPLETE: CPT

## 2023-12-18 PROCEDURE — 99214 OFFICE O/P EST MOD 30 MIN: CPT | Mod: 25

## 2023-12-18 RX ORDER — ASPIRIN 81 MG/1
81 TABLET, COATED ORAL
Qty: 90 | Refills: 3 | Status: DISCONTINUED | COMMUNITY
Start: 2023-05-30 | End: 2023-12-18

## 2024-01-03 ENCOUNTER — RX RENEWAL (OUTPATIENT)
Age: 54
End: 2024-01-03

## 2024-01-29 ENCOUNTER — APPOINTMENT (OUTPATIENT)
Dept: CARDIOLOGY | Facility: CLINIC | Age: 54
End: 2024-01-29
Payer: COMMERCIAL

## 2024-01-29 VITALS
SYSTOLIC BLOOD PRESSURE: 123 MMHG | DIASTOLIC BLOOD PRESSURE: 85 MMHG | BODY MASS INDEX: 30.77 KG/M2 | HEIGHT: 68 IN | WEIGHT: 203 LBS | OXYGEN SATURATION: 97 % | HEART RATE: 74 BPM

## 2024-01-29 DIAGNOSIS — R06.00 DYSPNEA, UNSPECIFIED: ICD-10-CM

## 2024-01-29 DIAGNOSIS — R07.89 OTHER CHEST PAIN: ICD-10-CM

## 2024-01-29 PROCEDURE — 99214 OFFICE O/P EST MOD 30 MIN: CPT | Mod: 25

## 2024-01-29 PROCEDURE — 93000 ELECTROCARDIOGRAM COMPLETE: CPT

## 2024-01-29 PROCEDURE — G2211 COMPLEX E/M VISIT ADD ON: CPT | Mod: NC,1L

## 2024-01-29 RX ORDER — METHYLPREDNISOLONE 4 MG/1
4 TABLET ORAL
Qty: 1 | Refills: 1 | Status: DISCONTINUED | COMMUNITY
Start: 2023-10-18 | End: 2024-01-29

## 2024-02-04 ENCOUNTER — RX RENEWAL (OUTPATIENT)
Age: 54
End: 2024-02-04

## 2024-02-06 RX ORDER — EVOLOCUMAB 140 MG/ML
140 INJECTION, SOLUTION SUBCUTANEOUS
Qty: 3 | Refills: 2 | Status: ACTIVE | COMMUNITY
Start: 2022-06-06 | End: 1900-01-01

## 2024-03-13 PROBLEM — R06.00 DYSPNEA: Status: ACTIVE | Noted: 2019-06-20

## 2024-03-13 PROBLEM — R07.89 CHEST DISCOMFORT: Status: ACTIVE | Noted: 2021-10-29

## 2024-03-13 NOTE — DISCUSSION/SUMMARY
[FreeTextEntry1] : CAD- no sx s/p LAD PCI  HTN- controlled  HLD- myalgias on statins.  Consult with Dr Pop Brooks in 6 mths  Time spent reviewing history, cath films, exam, EKG, pt discussion and note writing [EKG obtained to assist in diagnosis and management of assessed problem(s)] : EKG obtained to assist in diagnosis and management of assessed problem(s)

## 2024-03-13 NOTE — HISTORY OF PRESENT ILLNESS
[FreeTextEntry1] : 54 year old male with h/o HTN, HLD, CAD s/p LCX PCI with moderate LAD and RCA small .  Pt doing well until several weeks ago when he noted new onset chest pressure and SOB with exertion.  Stress was positive and sx continue.  PCI of the LAD was performed and sx have resolved

## 2024-03-13 NOTE — PHYSICAL EXAM
[Well Developed] : well developed [Well Nourished] : well nourished [No Acute Distress] : no acute distress [Normal Conjunctiva] : normal conjunctiva [No Carotid Bruit] : no carotid bruit [Normal Venous Pressure] : normal venous pressure [Normal S1, S2] : normal S1, S2 [No Murmur] : no murmur [No Rub] : no rub [Clear Lung Fields] : clear lung fields [No Gallop] : no gallop [Good Air Entry] : good air entry [No Respiratory Distress] : no respiratory distress  [Soft] : abdomen soft [Non Tender] : non-tender [No Masses/organomegaly] : no masses/organomegaly [Normal Bowel Sounds] : normal bowel sounds [Normal Gait] : normal gait [No Edema] : no edema [No Cyanosis] : no cyanosis [No Clubbing] : no clubbing [No Rash] : no rash [No Varicosities] : no varicosities [No Skin Lesions] : no skin lesions [Moves all extremities] : moves all extremities [No Focal Deficits] : no focal deficits [Normal Speech] : normal speech [Normal memory] : normal memory [Alert and Oriented] : alert and oriented

## 2024-03-14 NOTE — ED PROVIDER NOTE - CARDIAC, MLM
Go for blood tests as directed. Your doctor will do lab tests at regular visits to monitor the effects of this medicine. Please follow up with your doctor and keep your health care provider appointments.
Normal rate, regular rhythm.  Heart sounds S1, S2.  No murmurs, rubs or gallops.

## 2024-03-26 RX ORDER — EZETIMIBE 10 MG/1
10 TABLET ORAL
Qty: 90 | Refills: 3 | Status: ACTIVE | COMMUNITY
Start: 2021-11-17 | End: 1900-01-01

## 2024-03-26 RX ORDER — SEMAGLUTIDE 0.68 MG/ML
2 INJECTION, SOLUTION SUBCUTANEOUS
Qty: 2 | Refills: 0 | Status: DISCONTINUED | COMMUNITY
Start: 2022-06-03 | End: 2024-03-26

## 2024-03-26 RX ORDER — ASPIRIN ENTERIC COATED TABLETS 81 MG 81 MG/1
81 TABLET, DELAYED RELEASE ORAL
Qty: 90 | Refills: 3 | Status: ACTIVE | COMMUNITY
Start: 2022-04-13 | End: 1900-01-01

## 2024-04-11 ENCOUNTER — APPOINTMENT (OUTPATIENT)
Dept: RHEUMATOLOGY | Facility: CLINIC | Age: 54
End: 2024-04-11
Payer: COMMERCIAL

## 2024-04-11 VITALS
WEIGHT: 210 LBS | HEART RATE: 72 BPM | HEIGHT: 68 IN | RESPIRATION RATE: 16 BRPM | OXYGEN SATURATION: 99 % | SYSTOLIC BLOOD PRESSURE: 128 MMHG | DIASTOLIC BLOOD PRESSURE: 86 MMHG | TEMPERATURE: 97.3 F | BODY MASS INDEX: 31.83 KG/M2

## 2024-04-11 DIAGNOSIS — M10.9 GOUT, UNSPECIFIED: ICD-10-CM

## 2024-04-11 DIAGNOSIS — M65.9 SYNOVITIS AND TENOSYNOVITIS, UNSPECIFIED: ICD-10-CM

## 2024-04-11 PROCEDURE — 99214 OFFICE O/P EST MOD 30 MIN: CPT

## 2024-04-11 NOTE — ASSESSMENT
[FreeTextEntry1] : BRANDEN BADILLO is a 54 year old man with below --   # Recurrent L ankle synovitis, mildly + on exam today, responsive to NSAIDs and steroids, marked ESR/CRP now resolved. MRI with moreso OA/orthopedic changes but episodic nature ?subclinical gout flares vs MSK flares superimposed on OA  - check DECT L ankle to better eval for MSU crystals ?role for ULT if + which may decrease flares  - repeat sUA/CMP - c/w sparing PRN NSAIDs with food when most active  - can hold off on ortho eval at present  - c/w avoidance of food triggers   All questions and concerns addressed to my best possible ability, patient verbalizes understanding and is agreeable. RTC 4 months, will call to review CT results

## 2024-04-11 NOTE — HISTORY OF PRESENT ILLNESS
[FreeTextEntry1] : BRANDEN BADILLO is a 53 year old man who presents with polyarticular joint pain/swelling - L ankle recurrently x few months, swells but not red or hot, no clear triggers. R ankle and knee milder sx, appears to be 2/2 changing gait. Hard to ambulate 2/2 L ankle. Has seen podiatry, told XR only showing arthritis and they don't think its gout, s/p IM depo and medrol dose pack with PMD with marked relief, using prn NSAIDs with partial relief as milder flare began yesterday.   + gout hx x 5-7, prior flares have improved with naproxen and colchicine, no flares for a year, above sx don't feel like his usual gout which is hot/red/rapidly progressive, + triggers - ETOH, shellfish, pasta - has been avoiding all these  + L elbow arthralgia, not triggered by playing golf, improved with bracing  + dermatology said mild elbow psoriasis in summer but has since self resolved  + nonspecific itchy rash on LE   Inflammatory arthritis ROS negative for symmetrical peripheral joint synovitis, prolonged AM stiffness, enthesitis, dactylitis, eye inflammation, inflammatory low back pain, IBD.   Labs - ESR/CRP, sUA 6.1 Negative - RF/CCP  Negative FH for autoimmune d/o  MRI L ankle - tenosynovitis, osteochondral defect, prior sprain   ----------- 12/7/23 -- After last visit sx self resolved with NSAIDs but have recurred recently, no warmth this time, no clear trigger, more achy than severe pain, took medrol pack with little improvement, NSAIDs are most helpful. Milder achy pain diffusely - other ankle, elbows, but no warmth or synovitis and they are chronic.   4/11/24 -- Overall doing well since last visit so never went to ortho, in last few days has had recurrence of L ankle arthralgia only, no other active joints, no clear synovitis, responsive to NSAIDs, has been able to bear weight.

## 2024-04-11 NOTE — PHYSICAL EXAM
[General Appearance - Alert] : alert [General Appearance - In No Acute Distress] : in no acute distress [General Appearance - Well Nourished] : well nourished [Sclera] : the sclera and conjunctiva were normal [Extraocular Movements] : extraocular movements were intact [PERRL With Normal Accommodation] : pupils were equal in size, round, and reactive to light [Outer Ear] : the ears and nose were normal in appearance [Neck Appearance] : the appearance of the neck was normal [Edema] : there was no peripheral edema [No Spinal Tenderness] : no spinal tenderness [Abnormal Walk] : normal gait [Nail Clubbing] : no clubbing  or cyanosis of the fingernails [Musculoskeletal - Swelling] : no joint swelling seen [Motor Tone] : muscle strength and tone were normal [Skin Color & Pigmentation] : normal skin color and pigmentation [No Focal Deficits] : no focal deficits [Oriented To Time, Place, And Person] : oriented to person, place, and time [Impaired Insight] : insight and judgment were intact [Affect] : the affect was normal [FreeTextEntry1] : No synovits or warmt but TTP over L ankle, ROM intact. No other active joints, no tophi  [] : no rash

## 2024-04-15 ENCOUNTER — APPOINTMENT (OUTPATIENT)
Dept: CARDIOLOGY | Facility: CLINIC | Age: 54
End: 2024-04-15

## 2024-04-23 PROBLEM — E74.39 GLUCOSE INTOLERANCE: Status: ACTIVE | Noted: 2022-06-03

## 2024-04-30 ENCOUNTER — APPOINTMENT (OUTPATIENT)
Dept: CARDIOLOGY | Facility: CLINIC | Age: 54
End: 2024-04-30
Payer: COMMERCIAL

## 2024-04-30 DIAGNOSIS — E74.39 OTHER DISORDERS OF INTESTINAL CARBOHYDRATE ABSORPTION: ICD-10-CM

## 2024-04-30 PROCEDURE — G2211 COMPLEX E/M VISIT ADD ON: CPT | Mod: NC,1L

## 2024-04-30 PROCEDURE — 99215 OFFICE O/P EST HI 40 MIN: CPT

## 2024-04-30 RX ORDER — SEMAGLUTIDE 1.34 MG/ML
4 INJECTION, SOLUTION SUBCUTANEOUS
Qty: 1 | Refills: 2 | Status: ACTIVE | COMMUNITY
Start: 2024-03-26 | End: 1900-01-01

## 2024-06-18 ENCOUNTER — APPOINTMENT (OUTPATIENT)
Dept: FAMILY MEDICINE | Facility: CLINIC | Age: 54
End: 2024-06-18
Payer: COMMERCIAL

## 2024-06-18 VITALS
HEART RATE: 68 BPM | BODY MASS INDEX: 31.07 KG/M2 | HEIGHT: 68 IN | DIASTOLIC BLOOD PRESSURE: 75 MMHG | RESPIRATION RATE: 20 BRPM | SYSTOLIC BLOOD PRESSURE: 126 MMHG | WEIGHT: 205 LBS

## 2024-06-18 DIAGNOSIS — Z00.00 ENCOUNTER FOR GENERAL ADULT MEDICAL EXAMINATION W/OUT ABNORMAL FINDINGS: ICD-10-CM

## 2024-06-18 DIAGNOSIS — R73.01 IMPAIRED FASTING GLUCOSE: ICD-10-CM

## 2024-06-18 DIAGNOSIS — I25.10 ATHEROSCLEROTIC HEART DISEASE OF NATIVE CORONARY ARTERY W/OUT ANGINA PECTORIS: ICD-10-CM

## 2024-06-18 DIAGNOSIS — E78.5 HYPERLIPIDEMIA, UNSPECIFIED: ICD-10-CM

## 2024-06-18 PROCEDURE — 36415 COLL VENOUS BLD VENIPUNCTURE: CPT

## 2024-06-18 PROCEDURE — 99396 PREV VISIT EST AGE 40-64: CPT

## 2024-06-18 RX ORDER — IBUPROFEN 600 MG/1
600 TABLET, FILM COATED ORAL
Qty: 60 | Refills: 3 | Status: ACTIVE | COMMUNITY
Start: 2023-12-07 | End: 1900-01-01

## 2024-06-18 NOTE — COUNSELING
[Benefits of weight loss discussed] : Benefits of weight loss discussed [Encouraged to increase physical activity] : Encouraged to increase physical activity [de-identified] : Healthy eating and activities  [None] : None [Good understanding] : Patient has a good understanding of lifestyle changes and steps needed to achieve self management goal

## 2024-06-18 NOTE — PHYSICAL EXAM
[No Carotid Bruits] : no carotid bruits [No Abdominal Bruit] : a ~M bruit was not heard ~T in the abdomen [No Varicosities] : no varicosities [Pedal Pulses Present] : the pedal pulses are present [No Edema] : there was no peripheral edema [No Palpable Aorta] : no palpable aorta [No Extremity Clubbing/Cyanosis] : no extremity clubbing/cyanosis [Normal Posterior Cervical Nodes] : no posterior cervical lymphadenopathy [Normal Anterior Cervical Nodes] : no anterior cervical lymphadenopathy [Normal] : no rash [Coordination Grossly Intact] : coordination grossly intact [No Focal Deficits] : no focal deficits [Normal Gait] : normal gait [Speech Grossly Normal] : speech grossly normal [Memory Grossly Normal] : memory grossly normal [Normal Affect] : the affect was normal [Alert and Oriented x3] : oriented to person, place, and time [Normal Mood] : the mood was normal [Normal Insight/Judgement] : insight and judgment were intact [de-identified] : R TM and canal clear; L TM intact; canal partially obscured by cerumen

## 2024-06-18 NOTE — HEALTH RISK ASSESSMENT
[Yes] : Yes [Monthly or less (1 pt)] : Monthly or less (1 point) [1 or 2 (0 pts)] : 1 or 2 (0 points) [Never (0 pts)] : Never (0 points) [No] : In the past 12 months have you used drugs other than those required for medical reasons? No [0] : 2) Feeling down, depressed, or hopeless: Not at all (0) [Audit-CScore] : 1 [NKE6Ownyp] : 0 [Former] : Former [With Patient/Caregiver] : , with patient/caregiver [Reviewed no changes] : Reviewed, no changes [AdvancecareDate] : 06/24

## 2024-06-18 NOTE — HISTORY OF PRESENT ILLNESS
[FreeTextEntry1] : Requests comprehensive exam [de-identified] : Presents for comprehensive exam.  States feeling generally well; trying to watch diet and increase exercise with goal of weight loss.  Reviewed screening - placed order for an updated CT lung cancer screen; also note pt deferred the flu vaccine this past season. Following with Cardiology.

## 2024-06-19 ENCOUNTER — NON-APPOINTMENT (OUTPATIENT)
Age: 54
End: 2024-06-19

## 2024-06-19 LAB
ALBUMIN SERPL ELPH-MCNC: 4.8 G/DL
ALP BLD-CCNC: 90 U/L
ALT SERPL-CCNC: 17 U/L
ANION GAP SERPL CALC-SCNC: 15 MMOL/L
APPEARANCE: CLEAR
AST SERPL-CCNC: 16 U/L
BILIRUB SERPL-MCNC: 0.4 MG/DL
BILIRUBIN URINE: NEGATIVE
BLOOD URINE: NEGATIVE
BUN SERPL-MCNC: 11 MG/DL
CALCIUM SERPL-MCNC: 9.8 MG/DL
CHLORIDE SERPL-SCNC: 106 MMOL/L
CHOLEST SERPL-MCNC: 93 MG/DL
CO2 SERPL-SCNC: 21 MMOL/L
COLOR: YELLOW
CREAT SERPL-MCNC: 0.96 MG/DL
EGFR: 95 ML/MIN/1.73M2
ESTIMATED AVERAGE GLUCOSE: 117 MG/DL
FOLATE SERPL-MCNC: 9.6 NG/ML
GLUCOSE QUALITATIVE U: NEGATIVE MG/DL
GLUCOSE SERPL-MCNC: 110 MG/DL
HBA1C MFR BLD HPLC: 5.7 %
HCT VFR BLD CALC: 47.2 %
HDLC SERPL-MCNC: 48 MG/DL
HGB BLD-MCNC: 16.4 G/DL
KETONES URINE: NEGATIVE MG/DL
LDLC SERPL CALC-MCNC: 28 MG/DL
LEUKOCYTE ESTERASE URINE: NEGATIVE
MCHC RBC-ENTMCNC: 28.7 PG
MCHC RBC-ENTMCNC: 34.7 GM/DL
MCV RBC AUTO: 82.7 FL
NITRITE URINE: NEGATIVE
NONHDLC SERPL-MCNC: 45 MG/DL
PH URINE: 6
PLATELET # BLD AUTO: 320 K/UL
POTASSIUM SERPL-SCNC: 4 MMOL/L
PROT SERPL-MCNC: 7.6 G/DL
PROTEIN URINE: NORMAL MG/DL
PSA SERPL-MCNC: 1.36 NG/ML
RBC # BLD: 5.71 M/UL
RBC # FLD: 12.9 %
SODIUM SERPL-SCNC: 141 MMOL/L
SPECIFIC GRAVITY URINE: 1.02
T4 FREE SERPL-MCNC: 1.4 NG/DL
TRIGL SERPL-MCNC: 80 MG/DL
TSH SERPL-ACNC: 2.28 UIU/ML
UROBILINOGEN URINE: 0.2 MG/DL
VIT B12 SERPL-MCNC: 322 PG/ML
WBC # FLD AUTO: 9.42 K/UL

## 2024-07-01 ENCOUNTER — NON-APPOINTMENT (OUTPATIENT)
Age: 54
End: 2024-07-01

## 2024-07-01 VITALS — HEIGHT: 68 IN | WEIGHT: 210 LBS | BODY MASS INDEX: 31.83 KG/M2

## 2024-07-01 DIAGNOSIS — Z87.891 PERSONAL HISTORY OF NICOTINE DEPENDENCE: ICD-10-CM

## 2024-07-10 ENCOUNTER — APPOINTMENT (OUTPATIENT)
Dept: CT IMAGING | Facility: HOSPITAL | Age: 54
End: 2024-07-10
Payer: COMMERCIAL

## 2024-07-10 ENCOUNTER — OUTPATIENT (OUTPATIENT)
Dept: OUTPATIENT SERVICES | Facility: HOSPITAL | Age: 54
LOS: 1 days | End: 2024-07-10
Payer: COMMERCIAL

## 2024-07-10 DIAGNOSIS — Z87.891 PERSONAL HISTORY OF NICOTINE DEPENDENCE: ICD-10-CM

## 2024-07-10 DIAGNOSIS — Z95.5 PRESENCE OF CORONARY ANGIOPLASTY IMPLANT AND GRAFT: Chronic | ICD-10-CM

## 2024-07-10 PROCEDURE — 71271 CT THORAX LUNG CANCER SCR C-: CPT

## 2024-07-10 PROCEDURE — 71271 CT THORAX LUNG CANCER SCR C-: CPT | Mod: 26

## 2024-07-24 NOTE — PATIENT PROFILE ADULT - OVER THE PAST TWO WEEKS, HAVE YOU FELT LITTLE INTEREST OR PLEASURE IN DOING THINGS?
How Severe Is Your Skin Lesion?: moderate Have Your Skin Lesions Been Treated?: not been treated Is This A New Presentation, Or A Follow-Up?: Skin Lesions Which Family Member (Optional)?: Mother & Grandmother no

## 2024-07-29 ENCOUNTER — APPOINTMENT (OUTPATIENT)
Dept: CARDIOLOGY | Facility: CLINIC | Age: 54
End: 2024-07-29

## 2024-07-29 VITALS
DIASTOLIC BLOOD PRESSURE: 86 MMHG | HEIGHT: 68 IN | SYSTOLIC BLOOD PRESSURE: 126 MMHG | BODY MASS INDEX: 31.83 KG/M2 | HEART RATE: 69 BPM | WEIGHT: 210 LBS | OXYGEN SATURATION: 98 %

## 2024-07-29 PROCEDURE — 99214 OFFICE O/P EST MOD 30 MIN: CPT | Mod: 25

## 2024-07-29 PROCEDURE — G2211 COMPLEX E/M VISIT ADD ON: CPT | Mod: NC

## 2024-07-29 PROCEDURE — 93000 ELECTROCARDIOGRAM COMPLETE: CPT

## 2024-08-13 ENCOUNTER — APPOINTMENT (OUTPATIENT)
Dept: RHEUMATOLOGY | Facility: CLINIC | Age: 54
End: 2024-08-13

## 2024-09-07 PROBLEM — I21.4 NON-STEMI (NON-ST ELEVATED MYOCARDIAL INFARCTION): Status: ACTIVE | Noted: 2022-04-19

## 2024-10-21 NOTE — PATIENT PROFILE ADULT - FUNCTIONAL ASSESSMENT - DAILY ACTIVITY SCORE.
Has not reviewed [employer] message.   Please send letter with my [employer] message.      Thank you,   SANJAY Damico CNP  
[phone]

## 2025-01-29 ENCOUNTER — APPOINTMENT (OUTPATIENT)
Dept: CARDIOLOGY | Facility: CLINIC | Age: 55
End: 2025-01-29

## 2025-01-29 VITALS
OXYGEN SATURATION: 100 % | SYSTOLIC BLOOD PRESSURE: 116 MMHG | HEART RATE: 73 BPM | WEIGHT: 199 LBS | BODY MASS INDEX: 30.26 KG/M2 | DIASTOLIC BLOOD PRESSURE: 78 MMHG

## 2025-01-29 PROCEDURE — 99215 OFFICE O/P EST HI 40 MIN: CPT

## 2025-01-29 PROCEDURE — 93000 ELECTROCARDIOGRAM COMPLETE: CPT

## 2025-01-29 PROCEDURE — G2211 COMPLEX E/M VISIT ADD ON: CPT | Mod: NC

## 2025-01-30 LAB
ALBUMIN SERPL ELPH-MCNC: 4.7 G/DL
ALP BLD-CCNC: 88 U/L
ALT SERPL-CCNC: 22 U/L
ANION GAP SERPL CALC-SCNC: 13 MMOL/L
AST SERPL-CCNC: 18 U/L
BILIRUB SERPL-MCNC: 0.6 MG/DL
BUN SERPL-MCNC: 16 MG/DL
CALCIUM SERPL-MCNC: 9.5 MG/DL
CHLORIDE SERPL-SCNC: 106 MMOL/L
CO2 SERPL-SCNC: 23 MMOL/L
CREAT SERPL-MCNC: 1.03 MG/DL
EGFR: 87 ML/MIN/1.73M2
GLUCOSE SERPL-MCNC: 134 MG/DL
POTASSIUM SERPL-SCNC: 4.4 MMOL/L
PROT SERPL-MCNC: 7.2 G/DL
SODIUM SERPL-SCNC: 142 MMOL/L

## 2025-02-02 LAB
CHOLEST SERPL-MCNC: 102 MG/DL
HDLC SERPL-MCNC: 50 MG/DL
LDLC SERPL CALC-MCNC: 33 MG/DL
NONHDLC SERPL-MCNC: 52 MG/DL
PSA FREE FLD-MCNC: 39 %
PSA FREE SERPL-MCNC: 0.51 NG/ML
PSA SERPL-MCNC: 1.31 NG/ML
TRIGL SERPL-MCNC: 96 MG/DL

## 2025-02-05 RX ORDER — SEMAGLUTIDE 1 MG/.5ML
1 INJECTION, SOLUTION SUBCUTANEOUS
Qty: 1 | Refills: 3 | Status: ACTIVE | COMMUNITY
Start: 2025-02-05 | End: 1900-01-01

## 2025-02-06 ENCOUNTER — RX RENEWAL (OUTPATIENT)
Age: 55
End: 2025-02-06

## 2025-03-25 ENCOUNTER — RX RENEWAL (OUTPATIENT)
Age: 55
End: 2025-03-25

## 2025-04-09 ENCOUNTER — APPOINTMENT (OUTPATIENT)
Dept: CARDIOLOGY | Facility: CLINIC | Age: 55
End: 2025-04-09
Payer: COMMERCIAL

## 2025-04-09 DIAGNOSIS — I25.10 ATHEROSCLEROTIC HEART DISEASE OF NATIVE CORONARY ARTERY W/OUT ANGINA PECTORIS: ICD-10-CM

## 2025-04-09 DIAGNOSIS — R73.01 IMPAIRED FASTING GLUCOSE: ICD-10-CM

## 2025-04-09 PROCEDURE — 99214 OFFICE O/P EST MOD 30 MIN: CPT | Mod: 95

## 2025-04-09 PROCEDURE — G2211 COMPLEX E/M VISIT ADD ON: CPT | Mod: NC,95

## 2025-06-17 ENCOUNTER — APPOINTMENT (OUTPATIENT)
Dept: DERMATOLOGY | Facility: CLINIC | Age: 55
End: 2025-06-17

## 2025-06-17 PROBLEM — L23.7 CONTACT DERMATITIS DUE TO POISON IVY: Status: ACTIVE | Noted: 2025-06-17

## 2025-06-17 PROCEDURE — 99203 OFFICE O/P NEW LOW 30 MIN: CPT

## 2025-06-17 RX ORDER — BETAMETHASONE DIPROPIONATE 0.5 MG/G
0.05 CREAM, AUGMENTED TOPICAL TWICE DAILY
Qty: 1 | Refills: 2 | Status: ACTIVE | COMMUNITY
Start: 2025-06-17 | End: 1900-01-01

## 2025-07-30 ENCOUNTER — APPOINTMENT (OUTPATIENT)
Dept: CARDIOLOGY | Facility: CLINIC | Age: 55
End: 2025-07-30

## 2025-07-30 VITALS
HEIGHT: 68 IN | WEIGHT: 195 LBS | BODY MASS INDEX: 29.55 KG/M2 | SYSTOLIC BLOOD PRESSURE: 128 MMHG | OXYGEN SATURATION: 96 % | HEART RATE: 68 BPM | DIASTOLIC BLOOD PRESSURE: 83 MMHG

## 2025-07-30 PROCEDURE — 99214 OFFICE O/P EST MOD 30 MIN: CPT

## 2025-07-30 PROCEDURE — G2211 COMPLEX E/M VISIT ADD ON: CPT | Mod: NC

## 2025-07-30 PROCEDURE — 93000 ELECTROCARDIOGRAM COMPLETE: CPT
